# Patient Record
Sex: FEMALE | Race: WHITE | NOT HISPANIC OR LATINO | URBAN - METROPOLITAN AREA
[De-identification: names, ages, dates, MRNs, and addresses within clinical notes are randomized per-mention and may not be internally consistent; named-entity substitution may affect disease eponyms.]

---

## 2017-03-30 ENCOUNTER — OUTPATIENT (OUTPATIENT)
Dept: OUTPATIENT SERVICES | Facility: HOSPITAL | Age: 67
LOS: 1 days | Discharge: HOME | End: 2017-03-30

## 2017-06-27 DIAGNOSIS — I10 ESSENTIAL (PRIMARY) HYPERTENSION: ICD-10-CM

## 2017-06-27 DIAGNOSIS — Z86.73 PERSONAL HISTORY OF TRANSIENT ISCHEMIC ATTACK (TIA), AND CEREBRAL INFARCTION WITHOUT RESIDUAL DEFICITS: ICD-10-CM

## 2017-06-27 DIAGNOSIS — R41.0 DISORIENTATION, UNSPECIFIED: ICD-10-CM

## 2017-06-27 DIAGNOSIS — R42 DIZZINESS AND GIDDINESS: ICD-10-CM

## 2019-07-03 ENCOUNTER — APPOINTMENT (OUTPATIENT)
Dept: CARDIOLOGY | Facility: CLINIC | Age: 69
End: 2019-07-03

## 2019-09-09 ENCOUNTER — APPOINTMENT (OUTPATIENT)
Dept: CARDIOLOGY | Facility: CLINIC | Age: 69
End: 2019-09-09

## 2020-07-28 ENCOUNTER — RECORD ABSTRACTING (OUTPATIENT)
Age: 70
End: 2020-07-28

## 2020-07-28 DIAGNOSIS — Z78.9 OTHER SPECIFIED HEALTH STATUS: ICD-10-CM

## 2020-07-28 DIAGNOSIS — I10 ESSENTIAL (PRIMARY) HYPERTENSION: ICD-10-CM

## 2020-07-28 DIAGNOSIS — I49.3 VENTRICULAR PREMATURE DEPOLARIZATION: ICD-10-CM

## 2020-07-28 DIAGNOSIS — R42 DIZZINESS AND GIDDINESS: ICD-10-CM

## 2020-07-28 RX ORDER — OMEPRAZOLE 40 MG/1
40 CAPSULE, DELAYED RELEASE ORAL
Refills: 0 | Status: ACTIVE | COMMUNITY

## 2020-07-28 RX ORDER — CHOLECALCIFEROL (VITAMIN D3) 125 MCG
TABLET ORAL
Refills: 0 | Status: ACTIVE | COMMUNITY

## 2020-07-28 RX ORDER — ATORVASTATIN CALCIUM 80 MG/1
80 TABLET, FILM COATED ORAL DAILY
Refills: 0 | Status: ACTIVE | COMMUNITY

## 2020-07-28 RX ORDER — AMLODIPINE BESYLATE 5 MG/1
5 TABLET ORAL DAILY
Refills: 0 | Status: ACTIVE | COMMUNITY

## 2020-07-28 RX ORDER — PNV NO.95/FERROUS FUM/FOLIC AC 28MG-0.8MG
TABLET ORAL
Refills: 0 | Status: ACTIVE | COMMUNITY

## 2020-07-28 RX ORDER — ASPIRIN 325 MG/1
325 TABLET, FILM COATED ORAL
Refills: 0 | Status: ACTIVE | COMMUNITY

## 2020-07-28 RX ORDER — UMECLIDINIUM 62.5 UG/1
62.5 AEROSOL, POWDER ORAL
Refills: 0 | Status: ACTIVE | COMMUNITY

## 2020-07-28 RX ORDER — ALPRAZOLAM 0.25 MG/1
0.25 TABLET ORAL
Refills: 0 | Status: ACTIVE | COMMUNITY

## 2020-08-10 ENCOUNTER — APPOINTMENT (OUTPATIENT)
Dept: CARDIOLOGY | Facility: CLINIC | Age: 70
End: 2020-08-10

## 2020-09-29 ENCOUNTER — OUTPATIENT (OUTPATIENT)
Dept: OUTPATIENT SERVICES | Facility: HOSPITAL | Age: 70
LOS: 1 days | Discharge: HOME | End: 2020-09-29

## 2020-09-30 DIAGNOSIS — Z11.59 ENCOUNTER FOR SCREENING FOR OTHER VIRAL DISEASES: ICD-10-CM

## 2020-10-02 ENCOUNTER — OUTPATIENT (OUTPATIENT)
Dept: OUTPATIENT SERVICES | Facility: HOSPITAL | Age: 70
LOS: 1 days | Discharge: HOME | End: 2020-10-02

## 2020-10-02 VITALS — DIASTOLIC BLOOD PRESSURE: 75 MMHG | SYSTOLIC BLOOD PRESSURE: 170 MMHG | HEART RATE: 62 BPM | RESPIRATION RATE: 17 BRPM

## 2020-10-02 VITALS
RESPIRATION RATE: 18 BRPM | TEMPERATURE: 98 F | HEART RATE: 73 BPM | OXYGEN SATURATION: 97 % | DIASTOLIC BLOOD PRESSURE: 82 MMHG | WEIGHT: 166.89 LBS | HEIGHT: 64 IN | SYSTOLIC BLOOD PRESSURE: 179 MMHG

## 2020-10-02 NOTE — PACU DISCHARGE NOTE - COMMENTS
69 y o female S/P Right ECCE with IOL Implant, LSB/MAC without complications. VS /74 HR 61 RR 16 SaO2 99%. Pt tolerated procedure well.

## 2020-10-06 ENCOUNTER — OUTPATIENT (OUTPATIENT)
Dept: OUTPATIENT SERVICES | Facility: HOSPITAL | Age: 70
LOS: 1 days | Discharge: HOME | End: 2020-10-06

## 2020-10-06 DIAGNOSIS — Z11.59 ENCOUNTER FOR SCREENING FOR OTHER VIRAL DISEASES: ICD-10-CM

## 2020-10-07 DIAGNOSIS — Z79.82 LONG TERM (CURRENT) USE OF ASPIRIN: ICD-10-CM

## 2020-10-07 DIAGNOSIS — E55.9 VITAMIN D DEFICIENCY, UNSPECIFIED: ICD-10-CM

## 2020-10-07 DIAGNOSIS — K21.9 GASTRO-ESOPHAGEAL REFLUX DISEASE WITHOUT ESOPHAGITIS: ICD-10-CM

## 2020-10-07 DIAGNOSIS — H25.11 AGE-RELATED NUCLEAR CATARACT, RIGHT EYE: ICD-10-CM

## 2020-10-07 DIAGNOSIS — Z86.73 PERSONAL HISTORY OF TRANSIENT ISCHEMIC ATTACK (TIA), AND CEREBRAL INFARCTION WITHOUT RESIDUAL DEFICITS: ICD-10-CM

## 2020-10-07 DIAGNOSIS — I10 ESSENTIAL (PRIMARY) HYPERTENSION: ICD-10-CM

## 2020-10-08 NOTE — ASU PATIENT PROFILE, ADULT - PMH
After-cataract of both eyes    Benign hypertension without CHF    Chronic GERD    H/O anxiety disorder

## 2020-10-09 ENCOUNTER — OUTPATIENT (OUTPATIENT)
Dept: OUTPATIENT SERVICES | Facility: HOSPITAL | Age: 70
LOS: 1 days | Discharge: HOME | End: 2020-10-09

## 2020-10-09 VITALS
HEART RATE: 63 BPM | DIASTOLIC BLOOD PRESSURE: 72 MMHG | RESPIRATION RATE: 98 BRPM | TEMPERATURE: 98 F | WEIGHT: 166.01 LBS | OXYGEN SATURATION: 97 % | HEIGHT: 65 IN | SYSTOLIC BLOOD PRESSURE: 167 MMHG

## 2020-10-09 VITALS — SYSTOLIC BLOOD PRESSURE: 178 MMHG | DIASTOLIC BLOOD PRESSURE: 88 MMHG | HEART RATE: 64 BPM | RESPIRATION RATE: 17 BRPM

## 2020-10-09 DIAGNOSIS — Z98.41 CATARACT EXTRACTION STATUS, RIGHT EYE: Chronic | ICD-10-CM

## 2020-10-09 RX ORDER — AMLODIPINE BESYLATE 2.5 MG/1
1 TABLET ORAL
Qty: 0 | Refills: 0 | DISCHARGE

## 2020-10-09 RX ORDER — OMEPRAZOLE 10 MG/1
1 CAPSULE, DELAYED RELEASE ORAL
Qty: 0 | Refills: 0 | DISCHARGE

## 2020-10-14 DIAGNOSIS — E55.9 VITAMIN D DEFICIENCY, UNSPECIFIED: ICD-10-CM

## 2020-10-14 DIAGNOSIS — F41.9 ANXIETY DISORDER, UNSPECIFIED: ICD-10-CM

## 2020-10-14 DIAGNOSIS — H25.12 AGE-RELATED NUCLEAR CATARACT, LEFT EYE: ICD-10-CM

## 2020-10-14 DIAGNOSIS — I10 ESSENTIAL (PRIMARY) HYPERTENSION: ICD-10-CM

## 2020-10-14 DIAGNOSIS — Z79.82 LONG TERM (CURRENT) USE OF ASPIRIN: ICD-10-CM

## 2020-11-04 ENCOUNTER — APPOINTMENT (OUTPATIENT)
Dept: CARDIOLOGY | Facility: CLINIC | Age: 70
End: 2020-11-04
Payer: MEDICARE

## 2020-11-04 VITALS
DIASTOLIC BLOOD PRESSURE: 90 MMHG | BODY MASS INDEX: 27.83 KG/M2 | SYSTOLIC BLOOD PRESSURE: 140 MMHG | WEIGHT: 163 LBS | HEART RATE: 61 BPM | HEIGHT: 64 IN

## 2020-11-04 DIAGNOSIS — Z45.09 ENCOUNTER FOR ADJUSTMENT AND MANAGEMENT OF OTHER CARDIAC DEVICE: ICD-10-CM

## 2020-11-04 DIAGNOSIS — I67.9 CEREBROVASCULAR DISEASE, UNSPECIFIED: ICD-10-CM

## 2020-11-04 DIAGNOSIS — Z00.00 ENCOUNTER FOR GENERAL ADULT MEDICAL EXAMINATION W/OUT ABNORMAL FINDINGS: ICD-10-CM

## 2020-11-04 PROCEDURE — 99072 ADDL SUPL MATRL&STAF TM PHE: CPT

## 2020-11-04 PROCEDURE — 93000 ELECTROCARDIOGRAM COMPLETE: CPT

## 2020-11-04 PROCEDURE — 99214 OFFICE O/P EST MOD 30 MIN: CPT

## 2020-11-04 NOTE — ASSESSMENT
[FreeTextEntry1] : 68 y/o with an ILR implanted to r/o Afib , hx of TIA \par \par Interrogation of the loop recorder today , no events , no arrhythmia's . \par Battery reached EOS status . \par \par Discussed with the patient loop removal , she opted  to postpone any procedures at this time due to COVID 19 pandemic . She will call us to schedule for loop extraction if she changes her decision. \par \par Her BP was elevated today . Reports feeling anxious in the office. \par Advised to keep a BP log at home and to  follow up with her cardiologist/Dr. Kim  . She verbalized full understanding. \par Instructed to continue her current medications, f/u with PCP.  Patient discharged from EP services. \par

## 2020-11-04 NOTE — PHYSICAL EXAM
[General Appearance - Well Developed] : well developed [General Appearance - Well Nourished] : well nourished [Heart Rate And Rhythm] : heart rate and rhythm were normal [Heart Sounds] : normal S1 and S2 [] : no respiratory distress [Respiration, Rhythm And Depth] : normal respiratory rhythm and effort [Auscultation Breath Sounds / Voice Sounds] : lungs were clear to auscultation bilaterally [Clean] : clean [Dry] : dry [Well-Healed] : well-healed [Abdomen Soft] : soft [Nail Clubbing] : no clubbing of the fingernails [Cyanosis, Localized] : no localized cyanosis

## 2020-11-04 NOTE — HISTORY OF PRESENT ILLNESS
[de-identified] : 70 y/o female with pmh of TIA s/p ILR 3/2017,COPD,former smoker, HTN \par She is returning for follow up , loop recorder interrogation \par \par Denies CP/SOB or palpations. Denies dizziness, near syncope or syncope . \par \par ECG (11/4/2020): NSR at 61 bpm, normal axis, LVH  \par \par \par

## 2020-11-04 NOTE — PROCEDURE
[No] : not [de-identified] : [employer] [de-identified] : LOOP [de-identified] : [id number] [de-identified] : 3-30-17 [de-identified] : no events - Battery at [facility]

## 2021-11-14 ENCOUNTER — TRANSCRIPTION ENCOUNTER (OUTPATIENT)
Age: 71
End: 2021-11-14

## 2021-11-24 ENCOUNTER — TRANSCRIPTION ENCOUNTER (OUTPATIENT)
Age: 71
End: 2021-11-24

## 2021-12-29 ENCOUNTER — NON-APPOINTMENT (OUTPATIENT)
Age: 71
End: 2021-12-29

## 2022-01-24 PROBLEM — K21.9 GASTRO-ESOPHAGEAL REFLUX DISEASE WITHOUT ESOPHAGITIS: Chronic | Status: ACTIVE | Noted: 2020-10-09

## 2022-01-24 PROBLEM — H26.40 UNSPECIFIED SECONDARY CATARACT: Chronic | Status: ACTIVE | Noted: 2020-10-09

## 2022-03-21 ENCOUNTER — OUTPATIENT (OUTPATIENT)
Dept: OUTPATIENT SERVICES | Facility: HOSPITAL | Age: 72
LOS: 1 days | Discharge: HOME | End: 2022-03-21
Payer: MEDICARE

## 2022-03-21 DIAGNOSIS — R00.2 PALPITATIONS: ICD-10-CM

## 2022-03-21 DIAGNOSIS — Z98.41 CATARACT EXTRACTION STATUS, RIGHT EYE: Chronic | ICD-10-CM

## 2022-03-21 PROCEDURE — 33286 RMVL SUBQ CAR RHYTHM MNTR: CPT

## 2022-03-21 NOTE — H&P ADULT - NSHPPHYSICALEXAM_GEN_ALL_CORE
General: Elderly female, NAD, well appearing  Pulm: CTA bilaterally. No rales, wheezing or rhonchi  Cardiac: S1S2 RRR. Nu rubs, murmurs, or gallops  GI: Soft, non-tender, non-distended. + BS  PV: Warm and well perfused. No clubbing cyanosis or edema  Neuro: AO x4, PEDROZA, speech clear  Psych: Normal mood and affect

## 2022-03-21 NOTE — PROGRESS NOTE ADULT - SUBJECTIVE AND OBJECTIVE BOX
Electrophysiology Brief Post-Op Note    I have personally seen and examined the patient.  I agree with the history and physical which I have reviewed and noted any changes below.  03-21-22 @ 10:27    PRE-OP DIAGNOSIS: TIA    POST-OP DIAGNOSIS: TIA    PROCEDURE: Loop Explant    Physician: Dr. Merchant  Assistant: AMANDA Batista    ESTIMATED BLOOD LOSS:  1     mL    ANESTHESIA TYPE:  [  ]General Anesthesia  [  ] Sedation  [X  ] Local/Regional    CONDITION  [  ] Critical  [  ] Serious  [  ]Fair  [ X ]Good    SPECIMENS REMOVED (IF APPLICABLE):  Loop    IMPLANTS (IF APPLICABLE)  None    FINDINGS  PLAN OF CARE  - FU 4-6 weeks  - May shower in 2 days  - Remove band aid in 2 days

## 2022-03-21 NOTE — H&P ADULT - NSICDXPASTMEDICALHX_GEN_ALL_CORE_FT
PAST MEDICAL HISTORY:  After-cataract of both eyes     Benign hypertension without CHF     Chronic GERD     H/O anxiety disorder     Transient ischemic attack (TIA) 3/2017    
shortness of breath

## 2022-03-21 NOTE — H&P ADULT - HISTORY OF PRESENT ILLNESS
This is a 70 y/o female with PMH GERD, anxiety (xanax), HTN, COPD, former smoker, TIA s/p ILR who presents for loop explantation. Pt denies any recent illness, fever, chills, palpitations, SOB, chest pain, abd pain, n/v/d/c, dysuria.

## 2022-03-21 NOTE — H&P ADULT - ASSESSMENT
EP: Dr. Merchant  Cardiology: Dr. Kim    This is a 72 y/o female with PMH GERD, anxiety (xanax), HTN, COPD, former smoker, TIA s/p ILR who presents for loop explantation. EP: Dr. Merchant  Cardiology: Dr. Kim    This is a 70 y/o female with PMH GERD, anxiety (xanax), HTN, COPD, former smoker, TIA s/p ILR who presents for loop explantation.    Plan:  - Loop explant today  - Resume diet  - Resume home meds  - No need for further follow up with EP  - Routine fu with cardiologist

## 2022-03-21 NOTE — CHART NOTE - NSCHARTNOTEFT_GEN_A_CORE
Cardiac Electrophysiology Procedure Note    Procedure: [employer]  Implantable Loop Recorder Explant    Indication: TIA    Attending: Dr. Merchant	  Assisting: NP Gatdula    EQUIPMENT EXPLANTED   :   [employer]onic Linq  Serial Number  : [id number]    DESCRIPTION OF PROCEDURE  The patient was brought to the Procedure Room in a nonsedated and fasting state. Informed, written consent was obtained prior to the procedure.  The left shoulder region was cleaned and prepped with serial applications of Chlorhexidine. Patient was then covered with sterile drapes in the usual manner. Blood pressure, oxygenation and level of comfort were stable throughout.     	The left parasternal region was defined; 10 cc of lidocaine solution were infiltrated into the skin overlying the left deltopectoral groove. A 5 mm. incision was then made. The loop recorder was removed from under the skin.  The wound margins approximated well, without any tension or overlap. The wound was closed using dermabond. A dry, sterile dressing was placed over this.     COMPLICATIONS:  The patient tolerated the procedure well. There were no immediate complications.    CONCLUSIONS:  Successful explant of loop recorder.

## 2022-03-25 DIAGNOSIS — F41.9 ANXIETY DISORDER, UNSPECIFIED: ICD-10-CM

## 2022-03-25 DIAGNOSIS — K21.9 GASTRO-ESOPHAGEAL REFLUX DISEASE WITHOUT ESOPHAGITIS: ICD-10-CM

## 2022-03-25 DIAGNOSIS — I10 ESSENTIAL (PRIMARY) HYPERTENSION: ICD-10-CM

## 2022-03-25 DIAGNOSIS — Z45.09 ENCOUNTER FOR ADJUSTMENT AND MANAGEMENT OF OTHER CARDIAC DEVICE: ICD-10-CM

## 2022-03-25 DIAGNOSIS — Z79.82 LONG TERM (CURRENT) USE OF ASPIRIN: ICD-10-CM

## 2022-03-25 DIAGNOSIS — J44.9 CHRONIC OBSTRUCTIVE PULMONARY DISEASE, UNSPECIFIED: ICD-10-CM

## 2022-03-25 DIAGNOSIS — Z87.891 PERSONAL HISTORY OF NICOTINE DEPENDENCE: ICD-10-CM

## 2022-03-25 DIAGNOSIS — Z86.73 PERSONAL HISTORY OF TRANSIENT ISCHEMIC ATTACK (TIA), AND CEREBRAL INFARCTION WITHOUT RESIDUAL DEFICITS: ICD-10-CM

## 2022-08-04 NOTE — ASU PATIENT PROFILE, ADULT - CENTRAL VENOUS CATHETER
no
(1) Mild-to-moderate dysarthria; patient slurs at least some words and, at worst, can be understood with some difficulty

## 2022-12-12 ENCOUNTER — EMERGENCY (EMERGENCY)
Facility: HOSPITAL | Age: 72
LOS: 0 days | Discharge: HOME | End: 2022-12-12
Attending: EMERGENCY MEDICINE | Admitting: EMERGENCY MEDICINE

## 2022-12-12 VITALS
DIASTOLIC BLOOD PRESSURE: 60 MMHG | OXYGEN SATURATION: 96 % | HEART RATE: 90 BPM | TEMPERATURE: 98 F | SYSTOLIC BLOOD PRESSURE: 136 MMHG | WEIGHT: 160.06 LBS | HEIGHT: 64 IN | RESPIRATION RATE: 22 BRPM

## 2022-12-12 VITALS — DIASTOLIC BLOOD PRESSURE: 74 MMHG | HEART RATE: 82 BPM | SYSTOLIC BLOOD PRESSURE: 130 MMHG

## 2022-12-12 DIAGNOSIS — Z79.82 LONG TERM (CURRENT) USE OF ASPIRIN: ICD-10-CM

## 2022-12-12 DIAGNOSIS — F41.0 PANIC DISORDER [EPISODIC PAROXYSMAL ANXIETY]: ICD-10-CM

## 2022-12-12 DIAGNOSIS — Z87.891 PERSONAL HISTORY OF NICOTINE DEPENDENCE: ICD-10-CM

## 2022-12-12 DIAGNOSIS — E87.6 HYPOKALEMIA: ICD-10-CM

## 2022-12-12 DIAGNOSIS — R53.0 NEOPLASTIC (MALIGNANT) RELATED FATIGUE: ICD-10-CM

## 2022-12-12 DIAGNOSIS — Z98.41 CATARACT EXTRACTION STATUS, RIGHT EYE: ICD-10-CM

## 2022-12-12 DIAGNOSIS — Z86.73 PERSONAL HISTORY OF TRANSIENT ISCHEMIC ATTACK (TIA), AND CEREBRAL INFARCTION WITHOUT RESIDUAL DEFICITS: ICD-10-CM

## 2022-12-12 DIAGNOSIS — K21.9 GASTRO-ESOPHAGEAL REFLUX DISEASE WITHOUT ESOPHAGITIS: ICD-10-CM

## 2022-12-12 DIAGNOSIS — Z98.41 CATARACT EXTRACTION STATUS, RIGHT EYE: Chronic | ICD-10-CM

## 2022-12-12 DIAGNOSIS — I10 ESSENTIAL (PRIMARY) HYPERTENSION: ICD-10-CM

## 2022-12-12 PROBLEM — G45.9 TRANSIENT CEREBRAL ISCHEMIC ATTACK, UNSPECIFIED: Chronic | Status: ACTIVE | Noted: 2022-03-21

## 2022-12-12 LAB
ALBUMIN SERPL ELPH-MCNC: 3.6 G/DL — SIGNIFICANT CHANGE UP (ref 3.5–5.2)
ALP SERPL-CCNC: 103 U/L — SIGNIFICANT CHANGE UP (ref 30–115)
ALT FLD-CCNC: 33 U/L — SIGNIFICANT CHANGE UP (ref 0–41)
ANION GAP SERPL CALC-SCNC: 16 MMOL/L — HIGH (ref 7–14)
AST SERPL-CCNC: 43 U/L — HIGH (ref 0–41)
BASOPHILS # BLD AUTO: 0.03 K/UL — SIGNIFICANT CHANGE UP (ref 0–0.2)
BASOPHILS NFR BLD AUTO: 0.3 % — SIGNIFICANT CHANGE UP (ref 0–1)
BILIRUB SERPL-MCNC: 0.4 MG/DL — SIGNIFICANT CHANGE UP (ref 0.2–1.2)
BUN SERPL-MCNC: 17 MG/DL — SIGNIFICANT CHANGE UP (ref 10–20)
CALCIUM SERPL-MCNC: 6 MG/DL — LOW (ref 8.4–10.5)
CHLORIDE SERPL-SCNC: 98 MMOL/L — SIGNIFICANT CHANGE UP (ref 98–110)
CO2 SERPL-SCNC: 26 MMOL/L — SIGNIFICANT CHANGE UP (ref 17–32)
CREAT SERPL-MCNC: 0.7 MG/DL — SIGNIFICANT CHANGE UP (ref 0.7–1.5)
EGFR: 91 ML/MIN/1.73M2 — SIGNIFICANT CHANGE UP
EOSINOPHIL # BLD AUTO: 0.07 K/UL — SIGNIFICANT CHANGE UP (ref 0–0.7)
EOSINOPHIL NFR BLD AUTO: 0.8 % — SIGNIFICANT CHANGE UP (ref 0–8)
GLUCOSE SERPL-MCNC: 78 MG/DL — SIGNIFICANT CHANGE UP (ref 70–99)
HCT VFR BLD CALC: 37 % — SIGNIFICANT CHANGE UP (ref 37–47)
HGB BLD-MCNC: 12.1 G/DL — SIGNIFICANT CHANGE UP (ref 12–16)
IMM GRANULOCYTES NFR BLD AUTO: 0.2 % — SIGNIFICANT CHANGE UP (ref 0.1–0.3)
LYMPHOCYTES # BLD AUTO: 1.24 K/UL — SIGNIFICANT CHANGE UP (ref 1.2–3.4)
LYMPHOCYTES # BLD AUTO: 13.9 % — LOW (ref 20.5–51.1)
MCHC RBC-ENTMCNC: 27.3 PG — SIGNIFICANT CHANGE UP (ref 27–31)
MCHC RBC-ENTMCNC: 32.7 G/DL — SIGNIFICANT CHANGE UP (ref 32–37)
MCV RBC AUTO: 83.5 FL — SIGNIFICANT CHANGE UP (ref 81–99)
MONOCYTES # BLD AUTO: 0.92 K/UL — HIGH (ref 0.1–0.6)
MONOCYTES NFR BLD AUTO: 10.3 % — HIGH (ref 1.7–9.3)
NEUTROPHILS # BLD AUTO: 6.67 K/UL — HIGH (ref 1.4–6.5)
NEUTROPHILS NFR BLD AUTO: 74.5 % — SIGNIFICANT CHANGE UP (ref 42.2–75.2)
NRBC # BLD: 0 /100 WBCS — SIGNIFICANT CHANGE UP (ref 0–0)
PLATELET # BLD AUTO: 295 K/UL — SIGNIFICANT CHANGE UP (ref 130–400)
POTASSIUM SERPL-MCNC: 2.6 MMOL/L — CRITICAL LOW (ref 3.5–5)
POTASSIUM SERPL-SCNC: 2.6 MMOL/L — CRITICAL LOW (ref 3.5–5)
PROT SERPL-MCNC: 7.1 G/DL — SIGNIFICANT CHANGE UP (ref 6–8)
RBC # BLD: 4.43 M/UL — SIGNIFICANT CHANGE UP (ref 4.2–5.4)
RBC # FLD: 14.2 % — SIGNIFICANT CHANGE UP (ref 11.5–14.5)
SODIUM SERPL-SCNC: 140 MMOL/L — SIGNIFICANT CHANGE UP (ref 135–146)
TROPONIN T SERPL-MCNC: <0.01 NG/ML — SIGNIFICANT CHANGE UP
WBC # BLD: 8.95 K/UL — SIGNIFICANT CHANGE UP (ref 4.8–10.8)
WBC # FLD AUTO: 8.95 K/UL — SIGNIFICANT CHANGE UP (ref 4.8–10.8)

## 2022-12-12 PROCEDURE — 99284 EMERGENCY DEPT VISIT MOD MDM: CPT

## 2022-12-12 PROCEDURE — 71046 X-RAY EXAM CHEST 2 VIEWS: CPT | Mod: 26

## 2022-12-12 RX ORDER — ASPIRIN/CALCIUM CARB/MAGNESIUM 324 MG
1 TABLET ORAL
Qty: 0 | Refills: 0 | DISCHARGE

## 2022-12-12 RX ORDER — CYCLOBENZAPRINE HYDROCHLORIDE 10 MG/1
1 TABLET, FILM COATED ORAL
Qty: 21 | Refills: 0
Start: 2022-12-12 | End: 2022-12-18

## 2022-12-12 RX ORDER — ACETAMINOPHEN 500 MG
975 TABLET ORAL EVERY 6 HOURS
Refills: 0 | Status: DISCONTINUED | OUTPATIENT
Start: 2022-12-12 | End: 2022-12-12

## 2022-12-12 RX ORDER — IBUPROFEN 200 MG
1 TABLET ORAL
Qty: 28 | Refills: 0
Start: 2022-12-12 | End: 2022-12-18

## 2022-12-12 RX ORDER — AMLODIPINE BESYLATE 2.5 MG/1
1 TABLET ORAL
Qty: 0 | Refills: 0 | DISCHARGE

## 2022-12-12 RX ORDER — ALPRAZOLAM 0.25 MG
1 TABLET ORAL
Qty: 0 | Refills: 0 | DISCHARGE

## 2022-12-12 RX ORDER — POTASSIUM CHLORIDE 20 MEQ
1 PACKET (EA) ORAL
Qty: 14 | Refills: 0
Start: 2022-12-12 | End: 2022-12-18

## 2022-12-12 RX ORDER — POTASSIUM CHLORIDE 20 MEQ
40 PACKET (EA) ORAL ONCE
Refills: 0 | Status: COMPLETED | OUTPATIENT
Start: 2022-12-12 | End: 2022-12-12

## 2022-12-12 RX ORDER — ATORVASTATIN CALCIUM 80 MG/1
1 TABLET, FILM COATED ORAL
Qty: 0 | Refills: 0 | DISCHARGE

## 2022-12-12 RX ADMIN — Medication 40 MILLIEQUIVALENT(S): at 16:52

## 2022-12-12 RX ADMIN — Medication 975 MILLIGRAM(S): at 15:34

## 2022-12-12 NOTE — ED ADULT NURSE NOTE - NSICDXPASTMEDICALHX_GEN_ALL_CORE_FT
PAST MEDICAL HISTORY:  After-cataract of both eyes     Benign hypertension without CHF     Chronic GERD     H/O anxiety disorder     Transient ischemic attack (TIA) 3/2017

## 2022-12-12 NOTE — ED PROVIDER NOTE - OBJECTIVE STATEMENT
73 y/o female p/w complaint of panic attack today. Patient states this is 3rd time in past 2 days that she has had a panic attack. She states her daughter recently came home from being admitted to hospital for 6 weeks with severe COVID, patient attributes these episodes to stress due to her daughter's health. Pt states during the attack she feels paresthesias in her face, feels chest pressure, all of which subsides within a few minutes. Symptoms are non-exertional. Patient also endorses neck stiffness x 1 week which she attributes to sleeping on the couch near her daughter's stretcher at home. Pt denies CP, SOB, n/v, diaphoresis, LE swelling.

## 2022-12-12 NOTE — ED PROVIDER NOTE - ATTENDING APP SHARED VISIT CONTRIBUTION OF CARE
Patient complains of anxiety and generalized weakness.  She attributes this to her daughter's illness.  She states that she has been eating poorly recently. She denies chest pain, shortness of breath, palpitations, syncope. Vital signs noted.  No apparent distress.  Negative Chvostek's sign.  Chest is clear.  Heart regular rate no murmur.  Abdomen nontender.  Extremity equal pulses.  Neuro grossly intact.  Diagnostic testing reviewed.  No electrolyte disturbances.  Etiology of electrolyte disturbances may be nutritional.  Patient is not on a diuretic currently.  Disposition decision making discussed with patient.  She strongly prefers oral potassium supplementation at this time.  I will defer calcium supplementation at this time until further outpatient work-up can be initiated.  Copies of all diagnostic testing provided to patient aid in follow-up.  Strict return precautions discussed with patient.

## 2022-12-12 NOTE — ED PROVIDER NOTE - PATIENT PORTAL LINK FT
You can access the FollowMyHealth Patient Portal offered by [facility] by registering at the following website: [email or web address]. By joining [employer] ([employer]’s FollowMyHealth portal, you will also be able to view your health information using other applications (apps) compatible with our system.

## 2022-12-12 NOTE — ED PROVIDER NOTE - NS ED ROS FT
Constitutional: (-) fever, (-) chills  Eyes: (-) visual changes  ENT: (-) nasal congestion  Cardiovascular: (-) chest pain, (-) syncope  Respiratory: (-) cough, (-) shortness of breath, (-) dyspnea,   Gastrointestinal: (-) vomiting, (-)nausea,  Musculoskeletal: (-) neck pain, (-) back pain, (-) joint pain,  Integumentary: (-) rash, (-) edema, (-) bruises  Neurological: (-) headache, (-) loc, (-) dizziness, (-) tingling, (-)numbness,  Psychiatric: (-) hallucinations, (+)anxiety, (-)depression, (-)SI/HI  Peripheral Vascular: (-) leg swelling  :  (-)dysuria,  (-) hematuria  Allergic/Immunologic: (-) pruritus

## 2022-12-12 NOTE — ED PROVIDER NOTE - PHYSICAL EXAMINATION
Physical Exam    Vital Signs: I have reviewed the initial vital signs.  Constitutional: appears stated age, no acute distress  Eyes: Conjunctiva pink, Sclera clear, PERRLA, EOMI.  ENT: OP is clear without exudates, normal dentition, normal gingival, tongue without swelling, TMs clear b/l, nasal turbinates without erythema or discharge, no lymphadenopathy.  Cardiovascular: S1 and S2, regular rate, regular rhythm, well-perfused extremities, radial pulses equal and 2+, pedal pulses 2+ and equal  Respiratory: unlabored respiratory effort, clear to auscultation bilaterally no wheezing, rales and rhonchi  Gastrointestinal: soft, non-tender abdomen, no pulsatile mass, normal bowl sounds  Musculoskeletal: supple neck, no lower extremity edema, no midline tenderness  Integumentary: warm, dry, no rash  Neurologic: awake, alert, oriented x 3, cranial nerves II-XII grossly intact, extremities’ motor and sensory functions grossly intact, finger to nose intact, normal gait, no pronator drift.

## 2022-12-12 NOTE — ED PROVIDER NOTE - NSFOLLOWUPINSTRUCTIONS_ED_ALL_ED_FT
Please follow-up with your PCP in the next 1-3 days to address potential causes of hypokalemia.     Anxiety    Generalized anxiety disorder (SANDRINE) is a mental disorder. It is defined as anxiety that is not necessarily related to specific events or activities or is out of proportion to said events. Symptoms include restlessness, fatigue, difficulty concentrations, irritability and difficulty concentrating. It may interfere with life functions, including relationships, work, and school. If you were started on a medication, make sure to take exactly as prescribed and follow up with a psychiatrist.    SEEK IMMEDIATE MEDICAL CARE IF YOU HAVE ANY OF THE FOLLOWING SYMPTOMS: thoughts about hurting killing yourself, thoughts about hurting or killing somebody else, hallucinations, or worsening depression.

## 2022-12-28 ENCOUNTER — INPATIENT (INPATIENT)
Facility: HOSPITAL | Age: 72
LOS: 2 days | Discharge: HOME HEALTH | End: 2022-12-31
Attending: INTERNAL MEDICINE | Admitting: INTERNAL MEDICINE
Payer: MEDICARE

## 2022-12-28 VITALS
TEMPERATURE: 96 F | SYSTOLIC BLOOD PRESSURE: 146 MMHG | RESPIRATION RATE: 18 BRPM | WEIGHT: 160.06 LBS | DIASTOLIC BLOOD PRESSURE: 76 MMHG | HEART RATE: 94 BPM | OXYGEN SATURATION: 94 % | HEIGHT: 64 IN

## 2022-12-28 DIAGNOSIS — Z98.41 CATARACT EXTRACTION STATUS, RIGHT EYE: Chronic | ICD-10-CM

## 2022-12-28 LAB
ALBUMIN SERPL ELPH-MCNC: 3.6 G/DL — SIGNIFICANT CHANGE UP (ref 3.5–5.2)
ALP SERPL-CCNC: 98 U/L — SIGNIFICANT CHANGE UP (ref 30–115)
ALT FLD-CCNC: 23 U/L — SIGNIFICANT CHANGE UP (ref 0–41)
ANION GAP SERPL CALC-SCNC: 17 MMOL/L — HIGH (ref 7–14)
ANION GAP SERPL CALC-SCNC: 17 MMOL/L — HIGH (ref 7–14)
APPEARANCE UR: CLEAR — SIGNIFICANT CHANGE UP
AST SERPL-CCNC: 32 U/L — SIGNIFICANT CHANGE UP (ref 0–41)
BACTERIA # UR AUTO: ABNORMAL
BASOPHILS # BLD AUTO: 0.02 K/UL — SIGNIFICANT CHANGE UP (ref 0–0.2)
BASOPHILS NFR BLD AUTO: 0.2 % — SIGNIFICANT CHANGE UP (ref 0–1)
BILIRUB SERPL-MCNC: 0.6 MG/DL — SIGNIFICANT CHANGE UP (ref 0.2–1.2)
BILIRUB UR-MCNC: ABNORMAL
BUN SERPL-MCNC: 17 MG/DL — SIGNIFICANT CHANGE UP (ref 10–20)
BUN SERPL-MCNC: 19 MG/DL — SIGNIFICANT CHANGE UP (ref 10–20)
CALCIUM SERPL-MCNC: 5.3 MG/DL — CRITICAL LOW (ref 8.4–10.5)
CALCIUM SERPL-MCNC: 6.4 MG/DL — LOW (ref 8.4–10.5)
CHLORIDE SERPL-SCNC: 105 MMOL/L — SIGNIFICANT CHANGE UP (ref 98–110)
CHLORIDE SERPL-SCNC: 99 MMOL/L — SIGNIFICANT CHANGE UP (ref 98–110)
CO2 SERPL-SCNC: 19 MMOL/L — SIGNIFICANT CHANGE UP (ref 17–32)
CO2 SERPL-SCNC: 24 MMOL/L — SIGNIFICANT CHANGE UP (ref 17–32)
COLOR SPEC: YELLOW — SIGNIFICANT CHANGE UP
CREAT SERPL-MCNC: 0.5 MG/DL — LOW (ref 0.7–1.5)
CREAT SERPL-MCNC: 0.6 MG/DL — LOW (ref 0.7–1.5)
DIFF PNL FLD: NEGATIVE — SIGNIFICANT CHANGE UP
EGFR: 94 ML/MIN/1.73M2 — SIGNIFICANT CHANGE UP
EGFR: 98 ML/MIN/1.73M2 — SIGNIFICANT CHANGE UP
EOSINOPHIL # BLD AUTO: 0.01 K/UL — SIGNIFICANT CHANGE UP (ref 0–0.7)
EOSINOPHIL NFR BLD AUTO: 0.1 % — SIGNIFICANT CHANGE UP (ref 0–8)
EPI CELLS # UR: ABNORMAL /HPF
GLUCOSE SERPL-MCNC: 103 MG/DL — HIGH (ref 70–99)
GLUCOSE SERPL-MCNC: 63 MG/DL — LOW (ref 70–99)
GLUCOSE UR QL: NEGATIVE MG/DL — SIGNIFICANT CHANGE UP
HCT VFR BLD CALC: 36.2 % — LOW (ref 37–47)
HGB BLD-MCNC: 12.2 G/DL — SIGNIFICANT CHANGE UP (ref 12–16)
IMM GRANULOCYTES NFR BLD AUTO: 0.3 % — SIGNIFICANT CHANGE UP (ref 0.1–0.3)
KETONES UR-MCNC: 40
LEUKOCYTE ESTERASE UR-ACNC: NEGATIVE — SIGNIFICANT CHANGE UP
LYMPHOCYTES # BLD AUTO: 0.92 K/UL — LOW (ref 1.2–3.4)
LYMPHOCYTES # BLD AUTO: 7.6 % — LOW (ref 20.5–51.1)
MAGNESIUM SERPL-MCNC: 0.4 MG/DL — CRITICAL LOW (ref 1.8–2.4)
MCHC RBC-ENTMCNC: 27.9 PG — SIGNIFICANT CHANGE UP (ref 27–31)
MCHC RBC-ENTMCNC: 33.7 G/DL — SIGNIFICANT CHANGE UP (ref 32–37)
MCV RBC AUTO: 82.6 FL — SIGNIFICANT CHANGE UP (ref 81–99)
MONOCYTES # BLD AUTO: 0.81 K/UL — HIGH (ref 0.1–0.6)
MONOCYTES NFR BLD AUTO: 6.7 % — SIGNIFICANT CHANGE UP (ref 1.7–9.3)
NEUTROPHILS # BLD AUTO: 10.25 K/UL — HIGH (ref 1.4–6.5)
NEUTROPHILS NFR BLD AUTO: 85.1 % — HIGH (ref 42.2–75.2)
NITRITE UR-MCNC: NEGATIVE — SIGNIFICANT CHANGE UP
NRBC # BLD: 0 /100 WBCS — SIGNIFICANT CHANGE UP (ref 0–0)
PH UR: 5.5 — SIGNIFICANT CHANGE UP (ref 5–8)
PLATELET # BLD AUTO: 247 K/UL — SIGNIFICANT CHANGE UP (ref 130–400)
POTASSIUM SERPL-MCNC: 2.6 MMOL/L — CRITICAL LOW (ref 3.5–5)
POTASSIUM SERPL-MCNC: 3.8 MMOL/L — SIGNIFICANT CHANGE UP (ref 3.5–5)
POTASSIUM SERPL-SCNC: 2.6 MMOL/L — CRITICAL LOW (ref 3.5–5)
POTASSIUM SERPL-SCNC: 3.8 MMOL/L — SIGNIFICANT CHANGE UP (ref 3.5–5)
PROT SERPL-MCNC: 6.7 G/DL — SIGNIFICANT CHANGE UP (ref 6–8)
PROT UR-MCNC: 30 MG/DL
RBC # BLD: 4.38 M/UL — SIGNIFICANT CHANGE UP (ref 4.2–5.4)
RBC # FLD: 14.6 % — HIGH (ref 11.5–14.5)
RBC CASTS # UR COMP ASSIST: NEGATIVE — SIGNIFICANT CHANGE UP
SARS-COV-2 RNA SPEC QL NAA+PROBE: SIGNIFICANT CHANGE UP
SODIUM SERPL-SCNC: 140 MMOL/L — SIGNIFICANT CHANGE UP (ref 135–146)
SODIUM SERPL-SCNC: 141 MMOL/L — SIGNIFICANT CHANGE UP (ref 135–146)
SP GR SPEC: 1.02 — SIGNIFICANT CHANGE UP (ref 1.01–1.03)
UROBILINOGEN FLD QL: 2 MG/DL
WBC # BLD: 12.05 K/UL — HIGH (ref 4.8–10.8)
WBC # FLD AUTO: 12.05 K/UL — HIGH (ref 4.8–10.8)
WBC UR QL: SIGNIFICANT CHANGE UP /HPF

## 2022-12-28 PROCEDURE — 99285 EMERGENCY DEPT VISIT HI MDM: CPT

## 2022-12-28 PROCEDURE — 99221 1ST HOSP IP/OBS SF/LOW 40: CPT

## 2022-12-28 PROCEDURE — 93010 ELECTROCARDIOGRAM REPORT: CPT

## 2022-12-28 RX ORDER — LANOLIN ALCOHOL/MO/W.PET/CERES
3 CREAM (GRAM) TOPICAL AT BEDTIME
Refills: 0 | Status: DISCONTINUED | OUTPATIENT
Start: 2022-12-28 | End: 2022-12-31

## 2022-12-28 RX ORDER — ATORVASTATIN CALCIUM 80 MG/1
80 TABLET, FILM COATED ORAL AT BEDTIME
Refills: 0 | Status: DISCONTINUED | OUTPATIENT
Start: 2022-12-28 | End: 2022-12-31

## 2022-12-28 RX ORDER — AMLODIPINE BESYLATE 2.5 MG/1
10 TABLET ORAL DAILY
Refills: 0 | Status: DISCONTINUED | OUTPATIENT
Start: 2022-12-28 | End: 2022-12-29

## 2022-12-28 RX ORDER — ASPIRIN/CALCIUM CARB/MAGNESIUM 324 MG
325 TABLET ORAL DAILY
Refills: 0 | Status: DISCONTINUED | OUTPATIENT
Start: 2022-12-28 | End: 2022-12-31

## 2022-12-28 RX ORDER — SODIUM CHLORIDE 9 MG/ML
1000 INJECTION, SOLUTION INTRAVENOUS
Refills: 0 | Status: DISCONTINUED | OUTPATIENT
Start: 2022-12-28 | End: 2022-12-29

## 2022-12-28 RX ORDER — CALCIUM GLUCONATE 100 MG/ML
2 VIAL (ML) INTRAVENOUS ONCE
Refills: 0 | Status: COMPLETED | OUTPATIENT
Start: 2022-12-28 | End: 2022-12-28

## 2022-12-28 RX ORDER — ONDANSETRON 8 MG/1
4 TABLET, FILM COATED ORAL EVERY 8 HOURS
Refills: 0 | Status: DISCONTINUED | OUTPATIENT
Start: 2022-12-28 | End: 2022-12-31

## 2022-12-28 RX ORDER — CALCIUM CARBONATE 500(1250)
1 TABLET ORAL ONCE
Refills: 0 | Status: COMPLETED | OUTPATIENT
Start: 2022-12-28 | End: 2022-12-28

## 2022-12-28 RX ORDER — POTASSIUM CHLORIDE 20 MEQ
20 PACKET (EA) ORAL ONCE
Refills: 0 | Status: COMPLETED | OUTPATIENT
Start: 2022-12-28 | End: 2022-12-28

## 2022-12-28 RX ORDER — ALPRAZOLAM 0.25 MG
0.25 TABLET ORAL THREE TIMES A DAY
Refills: 0 | Status: DISCONTINUED | OUTPATIENT
Start: 2022-12-28 | End: 2022-12-31

## 2022-12-28 RX ORDER — ONDANSETRON 8 MG/1
4 TABLET, FILM COATED ORAL ONCE
Refills: 0 | Status: COMPLETED | OUTPATIENT
Start: 2022-12-28 | End: 2022-12-28

## 2022-12-28 RX ORDER — ENOXAPARIN SODIUM 100 MG/ML
40 INJECTION SUBCUTANEOUS EVERY 24 HOURS
Refills: 0 | Status: DISCONTINUED | OUTPATIENT
Start: 2022-12-28 | End: 2022-12-31

## 2022-12-28 RX ORDER — POTASSIUM CHLORIDE 20 MEQ
40 PACKET (EA) ORAL ONCE
Refills: 0 | Status: COMPLETED | OUTPATIENT
Start: 2022-12-28 | End: 2022-12-28

## 2022-12-28 RX ORDER — MAGNESIUM SULFATE 500 MG/ML
4 VIAL (ML) INJECTION ONCE
Refills: 0 | Status: COMPLETED | OUTPATIENT
Start: 2022-12-28 | End: 2022-12-28

## 2022-12-28 RX ORDER — ACETAMINOPHEN 500 MG
650 TABLET ORAL EVERY 6 HOURS
Refills: 0 | Status: DISCONTINUED | OUTPATIENT
Start: 2022-12-28 | End: 2022-12-31

## 2022-12-28 RX ADMIN — ATORVASTATIN CALCIUM 80 MILLIGRAM(S): 80 TABLET, FILM COATED ORAL at 21:09

## 2022-12-28 RX ADMIN — Medication 50 MILLIEQUIVALENT(S): at 13:48

## 2022-12-28 RX ADMIN — Medication 650 MILLIGRAM(S): at 22:14

## 2022-12-28 RX ADMIN — SODIUM CHLORIDE 75 MILLILITER(S): 9 INJECTION, SOLUTION INTRAVENOUS at 20:24

## 2022-12-28 RX ADMIN — ONDANSETRON 4 MILLIGRAM(S): 8 TABLET, FILM COATED ORAL at 14:35

## 2022-12-28 RX ADMIN — ENOXAPARIN SODIUM 40 MILLIGRAM(S): 100 INJECTION SUBCUTANEOUS at 22:16

## 2022-12-28 RX ADMIN — Medication 200 GRAM(S): at 18:02

## 2022-12-28 RX ADMIN — Medication 0.25 MILLIGRAM(S): at 20:25

## 2022-12-28 RX ADMIN — Medication 50 MILLIEQUIVALENT(S): at 15:56

## 2022-12-28 RX ADMIN — Medication 1 TABLET(S): at 13:45

## 2022-12-28 RX ADMIN — Medication 0.25 MILLIGRAM(S): at 15:30

## 2022-12-28 RX ADMIN — Medication 100 GRAM(S): at 13:44

## 2022-12-28 RX ADMIN — Medication 40 MILLIEQUIVALENT(S): at 13:47

## 2022-12-28 NOTE — H&P ADULT - NSHPPHYSICALEXAM_GEN_ALL_CORE
GENERAL:  73y/o Female NAD, resting comfortably.  HEAD:  Atraumatic, Normocephalic  EYES: EOMI, PERRLA, conjunctiva and sclera clear  NECK: Supple, No JVD, no cervical lymphadenopathy, non-tender  CHEST/LUNG: Clear to auscultation bilaterally; No wheeze, rhonchi, or rales  HEART: Regular rate and rhythm; S1&S2  ABDOMEN: Soft, Nontender, Nondistended x 4 quadrants; Bowel sounds present  EXTREMITIES:   Peripheral Pulses Present, No clubbing, no cyanosis, or no edema, no calf tenderness  PSYCH: AAOx3, cooperative, appropriate  NEUROLOGY: WNL  SKIN: WNL ICU Vital Signs Last 24 Hrs  T(C): 35.6 (28 Dec 2022 11:14), Max: 35.6 (28 Dec 2022 11:14)  T(F): 96 (28 Dec 2022 11:14), Max: 96 (28 Dec 2022 11:14)  HR: 83 (28 Dec 2022 13:41) (83 - 94)  BP: 115/74 (28 Dec 2022 13:41) (115/74 - 146/76)  BP(mean): --  ABP: --  ABP(mean): --  RR: 18 (28 Dec 2022 13:41) (18 - 18)  SpO2: 94% (28 Dec 2022 13:41) (94% - 94%)    O2 Parameters below as of 28 Dec 2022 13:41  Patient On (Oxygen Delivery Method): room air              GENERAL:  71y/o Female NAD, resting comfortably.  HEAD:  Atraumatic, Normocephalic  EYES: EOMI, PERRLA, conjunctiva and sclera clear  NECK: Supple, No JVD, no cervical lymphadenopathy, non-tender  CHEST/LUNG: Clear to auscultation bilaterally; No wheeze, rhonchi, or rales  HEART: Regular rate and rhythm; S1&S2  ABDOMEN: Soft, Nontender, Nondistended x 4 quadrants; Bowel sounds present  EXTREMITIES:   Peripheral Pulses Present, No clubbing, no cyanosis, or no edema, no calf tenderness  PSYCH: AAOx3, cooperative, appropriate  NEUROLOGY: WNL  SKIN: WNL

## 2022-12-28 NOTE — H&P ADULT - ASSESSMENT
72-year-old female, past medical history CVA (2017) anxiety, depression, hypertension, hyperlipidemia, presents emergency department for weakness and malaise for over two weeks.    Admitted for Multiple electrolyte imbalance.    # Hypokalemia  - admitted to telemetry  - IV Potassium replacement  - serial K levels  - repeat EKG in am    # Hypomagnesemia  - IV Mg replacement  - repeat Mg 1900 and am  - follow telemetry monitoring and repeat EKG    # Hypocalcemia  - IV Calcium replacement   - monitor repeat levels    # Leukocytosis  - Check Stat UA  - Repeat CBC w/ diff am  - Resp Viral Panel  - Procalcitonin, CRP  - lactate level    # Elevated Anion Gap  - Iv Hydration  - repeat BMP am

## 2022-12-28 NOTE — H&P ADULT - HISTORY OF PRESENT ILLNESS
72-year-old female, past medical history CVA (2017) anxiety, depression, hypertension, hyperlipidemia, presents emergency department for weakness and malaise for over two weeks. Pt states has neck and back pain she believes is associated with sleeping on a couch.  Pt also c/p SHEA and significant decrease in PO intake. Denies fever, chills, CP, visual changes, dizziness, numbness, tingling.

## 2022-12-28 NOTE — PATIENT PROFILE ADULT - FALL HARM RISK - HARM RISK INTERVENTIONS

## 2022-12-28 NOTE — ED PROVIDER NOTE - OBJECTIVE STATEMENT
72-year-old female, past medical history anxiety, depression, hypertension, hyperlipidemia, presents emergency department for low potassium associated with weakness specific pain or radiation. Denies fever, chills, cp, sob, neck pain, visual changes, nvd, dizziness, numbness, tingling.

## 2022-12-28 NOTE — ED ADULT TRIAGE NOTE - CHIEF COMPLAINT QUOTE
BIBA, as per EMS "pt c/o weakness, dizziness, unable to walk, leg/hand cramping x2weeks. recently hospitalized for low potassium. pt vomited all last night", stroke code not called due to time frame

## 2022-12-28 NOTE — H&P ADULT - NSHPLABSRESULTS_GEN_ALL_CORE
12.2   12.05 )-----------( 247      ( 28 Dec 2022 11:42 )             36.2       12-28    140  |  99  |  19  ----------------------------<  103<H>  2.6<LL>   |  24  |  0.6<L>    Ca    5.3<LL>      28 Dec 2022 11:42  Mg     0.4     12-28    TPro  6.7  /  Alb  3.6  /  TBili  0.6  /  DBili  x   /  AST  32  /  ALT  23  /  AlkPhos  98  12-28          < from: Xray Chest 2 Views PA/Lat (12.12.22 @ 15:03) >    Impression:    No radiographic evidence of acute cardiopulmonary disease.      --- End of Report ---      ALEJANDRO HENDRIX MD; Attending Radiologist  This document has been electronically signed. Dec 12 2022  3:07PM    < end of copied text >

## 2022-12-28 NOTE — ED ADULT NURSE NOTE - CCCP TRG CHIEF CMPLNT
MD Chad Kruse, RN  Rise in PSA noted. Please fax a copy to Dr. Cali Romero, for ptâs upcoming appointment for f/up h/o prostate cancer.      Thanks,     TK multiple medical complaints

## 2022-12-28 NOTE — H&P ADULT - NS ATTEND AMEND GEN_ALL_CORE FT
# Generalized weakness and muscle spasms due to severe electrolyte imbalance  #HTN  # BPH  #GERD  #Anxiety    Activity as tolerated  Fall precautions  Drug adverse effects reviewed  Replace electrolytes IV and monitor  Monitor labs and vitals  DVT prophylaxis  resume home medications # Generalized weakness and muscle spasms due to severe electrolyte imbalance  #HTN  # BPH  #GERD  #Anxiety    Admit to telemetry  telemetry monitoring  Activity as tolerated  Fall precautions  No ekg changes  Nephrology consult  Drug adverse effects reviewed  Replace electrolytes IV and monitor  Monitor labs and vitals  DVT prophylaxis  resume home medications # Generalized weakness and muscle spasms due to severe electrolyte imbalance  #HTN  # BPH  #GERD  #Anxiety    Admit to telemetry  telemetry monitoring  Critical care cosult for ICU monitoring  Activity as tolerated  Fall precautions  Nephrology consult  Drug adverse effects reviewed  Replace electrolytes IV and monitor  Monitor labs and vitals, UA and blood cultures  DVT prophylaxis  resume home medications # Generalized weakness and muscle spasms due to severe electrolyte imbalance  #HTN  # BPH  #GERD  #Anxiety    Admit to telemetry  telemetry monitoring  Critical care cosult for ICU monitoring  Activity as tolerated  Fall precautions  Nephrology consult  Drug adverse effects reviewed  Replace electrolytes IV and monitor  Monitor labs and vitals, UA and blood cultures  DVT prophylaxis  resume home medications    ADDENDUM:; DISCUSSED WITH INTENSIVIST DR AZAR, PATIENT ACCEPTED TO ICU. TRANSFER ORDER PLACED.

## 2022-12-28 NOTE — ED PROVIDER NOTE - ATTENDING APP SHARED VISIT CONTRIBUTION OF CARE
pt has significant and sx electrolye disturbance and is at risk for cardiac arrhythmia.  + Trousseau' sign during BP reading.  baseline EKG is wnl.  Patient will be admitted to a monitored setting.

## 2022-12-28 NOTE — ED PROVIDER NOTE - CARE PLAN
1 Principal Discharge DX:	Hypokalemia  Secondary Diagnosis:	Hypocalcemia  Secondary Diagnosis:	Hypomagnesemia

## 2022-12-28 NOTE — H&P ADULT - NSHPREVIEWOFSYSTEMS_GEN_ALL_CORE
REVIEW OF SYSTEMS:    CONSTITUTIONAL: POSITIVE  weakness, NO fevers or chills  EYES/ENT: No visual changes;  No vertigo or throat pain   NECK: No pain or stiffness  RESPIRATORY: No cough, wheezing, hemoptysis; No shortness of breath  CARDIOVASCULAR: No chest pain or palpitations  GASTROINTESTINAL: No abdominal or epigastric pain. No nausea, vomiting, or hematemesis; No diarrhea or constipation. No melena or hematochezia.  GENITOURINARY: No dysuria, frequency or hematuria  NEUROLOGICAL: No numbness or weakness  SKIN: No itching, rashes

## 2022-12-29 LAB
A1C WITH ESTIMATED AVERAGE GLUCOSE RESULT: 6.1 % — HIGH (ref 4–5.6)
ALBUMIN SERPL ELPH-MCNC: 3.2 G/DL — LOW (ref 3.5–5.2)
ALP SERPL-CCNC: 100 U/L — SIGNIFICANT CHANGE UP (ref 30–115)
ALT FLD-CCNC: 21 U/L — SIGNIFICANT CHANGE UP (ref 0–41)
ANION GAP SERPL CALC-SCNC: 13 MMOL/L — SIGNIFICANT CHANGE UP (ref 7–14)
AST SERPL-CCNC: 30 U/L — SIGNIFICANT CHANGE UP (ref 0–41)
BASOPHILS # BLD AUTO: 0.02 K/UL — SIGNIFICANT CHANGE UP (ref 0–0.2)
BASOPHILS NFR BLD AUTO: 0.2 % — SIGNIFICANT CHANGE UP (ref 0–1)
BILIRUB SERPL-MCNC: 0.6 MG/DL — SIGNIFICANT CHANGE UP (ref 0.2–1.2)
BUN SERPL-MCNC: 12 MG/DL — SIGNIFICANT CHANGE UP (ref 10–20)
CALCIUM SERPL-MCNC: 6.2 MG/DL — LOW (ref 8.4–10.5)
CHLORIDE SERPL-SCNC: 104 MMOL/L — SIGNIFICANT CHANGE UP (ref 98–110)
CO2 SERPL-SCNC: 23 MMOL/L — SIGNIFICANT CHANGE UP (ref 17–32)
CREAT SERPL-MCNC: <0.5 MG/DL — LOW (ref 0.7–1.5)
EGFR: 104 ML/MIN/1.73M2 — SIGNIFICANT CHANGE UP
EOSINOPHIL # BLD AUTO: 0.08 K/UL — SIGNIFICANT CHANGE UP (ref 0–0.7)
EOSINOPHIL NFR BLD AUTO: 1 % — SIGNIFICANT CHANGE UP (ref 0–8)
ESTIMATED AVERAGE GLUCOSE: 128 MG/DL — HIGH (ref 68–114)
GLUCOSE SERPL-MCNC: 120 MG/DL — HIGH (ref 70–99)
HCT VFR BLD CALC: 33.1 % — LOW (ref 37–47)
HCV AB S/CO SERPL IA: 0.04 COI — SIGNIFICANT CHANGE UP
HCV AB SERPL-IMP: SIGNIFICANT CHANGE UP
HGB BLD-MCNC: 10.9 G/DL — LOW (ref 12–16)
IMM GRANULOCYTES NFR BLD AUTO: 0.2 % — SIGNIFICANT CHANGE UP (ref 0.1–0.3)
LACTATE SERPL-SCNC: 1.2 MMOL/L — SIGNIFICANT CHANGE UP (ref 0.7–2)
LYMPHOCYTES # BLD AUTO: 1.37 K/UL — SIGNIFICANT CHANGE UP (ref 1.2–3.4)
LYMPHOCYTES # BLD AUTO: 16.7 % — LOW (ref 20.5–51.1)
MAGNESIUM SERPL-MCNC: 1.7 MG/DL — LOW (ref 1.8–2.4)
MCHC RBC-ENTMCNC: 27.3 PG — SIGNIFICANT CHANGE UP (ref 27–31)
MCHC RBC-ENTMCNC: 32.9 G/DL — SIGNIFICANT CHANGE UP (ref 32–37)
MCV RBC AUTO: 83 FL — SIGNIFICANT CHANGE UP (ref 81–99)
MONOCYTES # BLD AUTO: 0.6 K/UL — SIGNIFICANT CHANGE UP (ref 0.1–0.6)
MONOCYTES NFR BLD AUTO: 7.3 % — SIGNIFICANT CHANGE UP (ref 1.7–9.3)
NEUTROPHILS # BLD AUTO: 6.1 K/UL — SIGNIFICANT CHANGE UP (ref 1.4–6.5)
NEUTROPHILS NFR BLD AUTO: 74.6 % — SIGNIFICANT CHANGE UP (ref 42.2–75.2)
NRBC # BLD: 0 /100 WBCS — SIGNIFICANT CHANGE UP (ref 0–0)
PLATELET # BLD AUTO: 250 K/UL — SIGNIFICANT CHANGE UP (ref 130–400)
POTASSIUM SERPL-MCNC: 3.5 MMOL/L — SIGNIFICANT CHANGE UP (ref 3.5–5)
POTASSIUM SERPL-SCNC: 3.5 MMOL/L — SIGNIFICANT CHANGE UP (ref 3.5–5)
PROCALCITONIN SERPL-MCNC: 3.48 NG/ML — HIGH (ref 0.02–0.1)
PROT SERPL-MCNC: 6 G/DL — SIGNIFICANT CHANGE UP (ref 6–8)
RAPID RVP RESULT: SIGNIFICANT CHANGE UP
RBC # BLD: 3.99 M/UL — LOW (ref 4.2–5.4)
RBC # FLD: 14.6 % — HIGH (ref 11.5–14.5)
SARS-COV-2 RNA SPEC QL NAA+PROBE: SIGNIFICANT CHANGE UP
SODIUM SERPL-SCNC: 140 MMOL/L — SIGNIFICANT CHANGE UP (ref 135–146)
WBC # BLD: 8.19 K/UL — SIGNIFICANT CHANGE UP (ref 4.8–10.8)
WBC # FLD AUTO: 8.19 K/UL — SIGNIFICANT CHANGE UP (ref 4.8–10.8)

## 2022-12-29 PROCEDURE — 99232 SBSQ HOSP IP/OBS MODERATE 35: CPT

## 2022-12-29 PROCEDURE — 93010 ELECTROCARDIOGRAM REPORT: CPT

## 2022-12-29 RX ORDER — MAGNESIUM OXIDE 400 MG ORAL TABLET 241.3 MG
400 TABLET ORAL
Refills: 0 | Status: DISCONTINUED | OUTPATIENT
Start: 2022-12-29 | End: 2022-12-31

## 2022-12-29 RX ORDER — POTASSIUM CHLORIDE 20 MEQ
40 PACKET (EA) ORAL EVERY 12 HOURS
Refills: 0 | Status: COMPLETED | OUTPATIENT
Start: 2022-12-29 | End: 2022-12-31

## 2022-12-29 RX ADMIN — Medication 325 MILLIGRAM(S): at 11:35

## 2022-12-29 RX ADMIN — Medication 1 TABLET(S): at 11:35

## 2022-12-29 RX ADMIN — Medication 650 MILLIGRAM(S): at 11:38

## 2022-12-29 RX ADMIN — Medication 650 MILLIGRAM(S): at 00:52

## 2022-12-29 RX ADMIN — Medication 0.25 MILLIGRAM(S): at 21:31

## 2022-12-29 RX ADMIN — ATORVASTATIN CALCIUM 80 MILLIGRAM(S): 80 TABLET, FILM COATED ORAL at 21:32

## 2022-12-29 RX ADMIN — Medication 650 MILLIGRAM(S): at 22:36

## 2022-12-29 RX ADMIN — Medication 650 MILLIGRAM(S): at 05:14

## 2022-12-29 RX ADMIN — Medication 40 MILLIEQUIVALENT(S): at 17:07

## 2022-12-29 RX ADMIN — Medication 0.25 MILLIGRAM(S): at 11:38

## 2022-12-29 RX ADMIN — Medication 3 MILLIGRAM(S): at 21:33

## 2022-12-29 RX ADMIN — Medication 1 TABLET(S): at 21:32

## 2022-12-29 RX ADMIN — AMLODIPINE BESYLATE 10 MILLIGRAM(S): 2.5 TABLET ORAL at 05:15

## 2022-12-29 RX ADMIN — Medication 650 MILLIGRAM(S): at 21:31

## 2022-12-29 RX ADMIN — MAGNESIUM OXIDE 400 MG ORAL TABLET 400 MILLIGRAM(S): 241.3 TABLET ORAL at 11:36

## 2022-12-29 RX ADMIN — MAGNESIUM OXIDE 400 MG ORAL TABLET 400 MILLIGRAM(S): 241.3 TABLET ORAL at 17:06

## 2022-12-29 RX ADMIN — ENOXAPARIN SODIUM 40 MILLIGRAM(S): 100 INJECTION SUBCUTANEOUS at 21:33

## 2022-12-29 NOTE — PROGRESS NOTE ADULT - SUBJECTIVE AND OBJECTIVE BOX
Patient reports she has not been eating well, vomited once yesterday      T(F): 97.8 (12-29-22 @ 07:05), Max: 98.6 (12-28-22 @ 20:00)  HR: 69 (12-29-22 @ 07:05)  BP: 115/58 (12-29-22 @ 07:05)  RR: 18 (12-29-22 @ 07:05)  SpO2: 94% (12-29-22 @ 07:05) (88% - 96%)    PHYSICAL EXAM:  GENERAL: NAD  HEAD:  Atraumatic, Normocephalic  EYES: EOMI, PERRLA, conjunctiva and sclera clear  NERVOUS SYSTEM:  Alert & Oriented X3, no focal deficits   CHEST/LUNG: Clear to percussion bilaterally; No rales, rhonchi, wheezing, or rubs  HEART: Regular rate and rhythm; No murmurs, rubs, or gallops  ABDOMEN: Soft, Nontender, Nondistended; Bowel sounds present  EXTREMITIES:  2+ Peripheral Pulses, No clubbing, cyanosis, or edema    LABS  12-29    140  |  104  |  12  ----------------------------<  120<H>  3.5   |  23  |  <0.5<L>    Ca    6.2<L>      29 Dec 2022 05:46  Mg       TPro  6.0  /  Alb  3.2<L>  /  TBili  0.6  /  DBili  x   /  AST  30  /  ALT  21  /  AlkPhos  100  12-29                          10.9   8.19  )-----------( 250      ( 29 Dec 2022 05:46 )             33.1         CARDIAC ENZYMES            RADIOLOGY    MEDICATIONS  (STANDING):  amLODIPine   Tablet 10 milliGRAM(s) Oral daily  aspirin enteric coated 325 milliGRAM(s) Oral daily  atorvastatin 80 milliGRAM(s) Oral at bedtime  calcium carbonate 1250 mG  + Vitamin D (OsCal 500 + D) 1 Tablet(s) Oral every 8 hours  enoxaparin Injectable 40 milliGRAM(s) SubCutaneous every 24 hours  magnesium oxide 400 milliGRAM(s) Oral three times a day with meals  potassium chloride   Powder 40 milliEquivalent(s) Oral every 12 hours    MEDICATIONS  (PRN):  acetaminophen     Tablet .. 650 milliGRAM(s) Oral every 6 hours PRN Temp greater or equal to 38C (100.4F), Mild Pain (1 - 3)  ALPRAZolam 0.25 milliGRAM(s) Oral three times a day PRN anxiety do  melatonin 3 milliGRAM(s) Oral at bedtime PRN Insomnia  ondansetron Injectable 4 milliGRAM(s) IV Push every 8 hours PRN Nausea and/or Vomiting      Patient reports she has not been eating well, vomited once yesterday      T(F): 97.8 (12-29-22 @ 07:05), Max: 98.6 (12-28-22 @ 20:00)  HR: 69 (12-29-22 @ 07:05)  BP: 115/58 (12-29-22 @ 07:05)  RR: 18 (12-29-22 @ 07:05)  SpO2: 94% (12-29-22 @ 07:05) (88% - 96%)    PHYSICAL EXAM:  GENERAL: NAD  HEAD:  Atraumatic, Normocephalic  EYES: EOMI, PERRLA, conjunctiva and sclera clear  NERVOUS SYSTEM:  Alert & Oriented X3, no focal deficits   CHEST/LUNG: Clear to percussion bilaterally; No rales, rhonchi, wheezing, or rubs  HEART: Regular rate and rhythm; No murmurs, rubs, or gallops  ABDOMEN: Soft, Nontender, Nondistended; Bowel sounds present  EXTREMITIES:  2+ Peripheral Pulses, No clubbing, cyanosis, or edema    LABS  12-29    140  |  104  |  12  ----------------------------<  120<H>  3.5   |  23  |  <0.5<L>    Ca    6.2<L>      29 Dec 2022 05:46  Mg  1.7    TPro  6.0  /  Alb  3.2<L>  /  TBili  0.6  /  DBili  x   /  AST  30  /  ALT  21  /  AlkPhos  100  12-29                          10.9   8.19  )-----------( 250      ( 29 Dec 2022 05:46 )             33.1     < from: 12 Lead ECG (12.28.22 @ 11:44) >  Ventricular Rate 75 BPM    Atrial Rate 75 BPM    P-R Interval 168 ms    QRS Duration 70 ms    Q-T Interval 418 ms    QTC Calculation(Bazett) 466 ms    P Axis 30 degrees    R Axis 0 degrees    T Axis 19 degrees    Diagnosis Line Normal sinus rhythm  Normal ECG    < end of copied text >    RADIOLOGY  < from: Xray Chest 2 Views PA/Lat (12.12.22 @ 15:03) >  Impression:    No radiographic evidence of acute cardiopulmonary disease.      < end of copied text >    MEDICATIONS  (STANDING):  amLODIPine   Tablet 10 milliGRAM(s) Oral daily  aspirin enteric coated 325 milliGRAM(s) Oral daily  atorvastatin 80 milliGRAM(s) Oral at bedtime  calcium carbonate 1250 mG  + Vitamin D (OsCal 500 + D) 1 Tablet(s) Oral every 8 hours  enoxaparin Injectable 40 milliGRAM(s) SubCutaneous every 24 hours  magnesium oxide 400 milliGRAM(s) Oral three times a day with meals  potassium chloride   Powder 40 milliEquivalent(s) Oral every 12 hours    MEDICATIONS  (PRN):  acetaminophen     Tablet .. 650 milliGRAM(s) Oral every 6 hours PRN Temp greater or equal to 38C (100.4F), Mild Pain (1 - 3)  ALPRAZolam 0.25 milliGRAM(s) Oral three times a day PRN anxiety do  melatonin 3 milliGRAM(s) Oral at bedtime PRN Insomnia  ondansetron Injectable 4 milliGRAM(s) IV Push every 8 hours PRN Nausea and/or Vomiting

## 2022-12-29 NOTE — PROGRESS NOTE ADULT - ASSESSMENT
72-year-old female, past medical history CVA (2017) anxiety, depression, hypertension, hyperlipidemia, presents emergency department for weakness and malaise for over two weeks. Pt reports she has not been eating and vomited yesterday    Multiple electrolyte imbalance / hypokalemia / hypomagnesemia / hypocalcemia   72-year-old female, past medical history CVA (2017) anxiety, depression, hypertension, hyperlipidemia, presents emergency department for weakness and malaise for over two weeks. Pt reports she has not been eating and vomited yesterday    Multiple electrolyte imbalance / hypokalemia / hypomagnesemia / hypocalcemia     - lytes improving with supplementation   - continue oral K, mg and calcium   - follow repeat chem in am   - hold Norvasc as bp low   - pt   - no need for ICU   - DVT prophylaxis   72-year-old female, past medical history CVA (2017) anxiety, depression, hypertension, hyperlipidemia, presents emergency department for weakness and malaise for over two weeks. Pt reports she has not been eating and vomited yesterday    Multiple electrolyte imbalance / hypokalemia / hypomagnesemia / hypocalcemia     - lytes improving with supplementation   - continue oral K, mg and calcium   - follow repeat chem in am   - check TSH   - hold Norvasc as bp low   - pt   - no need for ICU   - DVT prophylaxis   72-year-old female, past medical history CVA (2017) anxiety, depression, hypertension, hyperlipidemia, presents emergency department for weakness and malaise for over two weeks. Pt reports she has not been eating and vomited yesterday    Multiple electrolyte imbalance / hypokalemia / hypomagnesemia / hypocalcemia     - lytes improving with supplementation   - continue oral K, mg and calcium   - follow repeat chem in am   - check TSH, amylase and  Lipase   - hold Norvasc as bp low   - pt   - no need for ICU   - DVT prophylaxis

## 2022-12-30 LAB
24R-OH-CALCIDIOL SERPL-MCNC: 42 NG/ML — SIGNIFICANT CHANGE UP (ref 30–80)
ALBUMIN SERPL ELPH-MCNC: 3.2 G/DL — LOW (ref 3.5–5.2)
ALP SERPL-CCNC: 108 U/L — SIGNIFICANT CHANGE UP (ref 30–115)
ALT FLD-CCNC: 21 U/L — SIGNIFICANT CHANGE UP (ref 0–41)
AMYLASE P1 CFR SERPL: 32 U/L — SIGNIFICANT CHANGE UP (ref 25–115)
ANION GAP SERPL CALC-SCNC: 11 MMOL/L — SIGNIFICANT CHANGE UP (ref 7–14)
AST SERPL-CCNC: 30 U/L — SIGNIFICANT CHANGE UP (ref 0–41)
BILIRUB SERPL-MCNC: 0.4 MG/DL — SIGNIFICANT CHANGE UP (ref 0.2–1.2)
BUN SERPL-MCNC: 9 MG/DL — LOW (ref 10–20)
CALCIUM SERPL-MCNC: 7.7 MG/DL — LOW (ref 8.4–10.5)
CHLORIDE SERPL-SCNC: 109 MMOL/L — SIGNIFICANT CHANGE UP (ref 98–110)
CO2 SERPL-SCNC: 21 MMOL/L — SIGNIFICANT CHANGE UP (ref 17–32)
CREAT SERPL-MCNC: <0.5 MG/DL — LOW (ref 0.7–1.5)
EGFR: 104 ML/MIN/1.73M2 — SIGNIFICANT CHANGE UP
GLUCOSE SERPL-MCNC: 94 MG/DL — SIGNIFICANT CHANGE UP (ref 70–99)
HCT VFR BLD CALC: 36.5 % — LOW (ref 37–47)
HGB BLD-MCNC: 12 G/DL — SIGNIFICANT CHANGE UP (ref 12–16)
LIDOCAIN IGE QN: 25 U/L — SIGNIFICANT CHANGE UP (ref 7–60)
MAGNESIUM SERPL-MCNC: 1.4 MG/DL — LOW (ref 1.8–2.4)
MCHC RBC-ENTMCNC: 27.5 PG — SIGNIFICANT CHANGE UP (ref 27–31)
MCHC RBC-ENTMCNC: 32.9 G/DL — SIGNIFICANT CHANGE UP (ref 32–37)
MCV RBC AUTO: 83.7 FL — SIGNIFICANT CHANGE UP (ref 81–99)
NRBC # BLD: 0 /100 WBCS — SIGNIFICANT CHANGE UP (ref 0–0)
PLATELET # BLD AUTO: 285 K/UL — SIGNIFICANT CHANGE UP (ref 130–400)
POTASSIUM SERPL-MCNC: 4.6 MMOL/L — SIGNIFICANT CHANGE UP (ref 3.5–5)
POTASSIUM SERPL-SCNC: 4.6 MMOL/L — SIGNIFICANT CHANGE UP (ref 3.5–5)
PROT SERPL-MCNC: 6.1 G/DL — SIGNIFICANT CHANGE UP (ref 6–8)
RBC # BLD: 4.36 M/UL — SIGNIFICANT CHANGE UP (ref 4.2–5.4)
RBC # FLD: 15.1 % — HIGH (ref 11.5–14.5)
SODIUM SERPL-SCNC: 141 MMOL/L — SIGNIFICANT CHANGE UP (ref 135–146)
TSH SERPL-MCNC: 0.86 UIU/ML — SIGNIFICANT CHANGE UP (ref 0.27–4.2)
WBC # BLD: 6.26 K/UL — SIGNIFICANT CHANGE UP (ref 4.8–10.8)
WBC # FLD AUTO: 6.26 K/UL — SIGNIFICANT CHANGE UP (ref 4.8–10.8)

## 2022-12-30 PROCEDURE — 70450 CT HEAD/BRAIN W/O DYE: CPT | Mod: 26

## 2022-12-30 PROCEDURE — 99233 SBSQ HOSP IP/OBS HIGH 50: CPT

## 2022-12-30 RX ORDER — MAGNESIUM SULFATE 500 MG/ML
1 VIAL (ML) INJECTION
Refills: 0 | Status: COMPLETED | OUTPATIENT
Start: 2022-12-30 | End: 2022-12-30

## 2022-12-30 RX ORDER — POLYETHYLENE GLYCOL 3350 17 G/17G
17 POWDER, FOR SOLUTION ORAL DAILY
Refills: 0 | Status: DISCONTINUED | OUTPATIENT
Start: 2022-12-30 | End: 2022-12-31

## 2022-12-30 RX ORDER — FAMOTIDINE 10 MG/ML
20 INJECTION INTRAVENOUS DAILY
Refills: 0 | Status: DISCONTINUED | OUTPATIENT
Start: 2022-12-30 | End: 2022-12-31

## 2022-12-30 RX ORDER — MAGNESIUM SULFATE 500 MG/ML
2 VIAL (ML) INJECTION ONCE
Refills: 0 | Status: DISCONTINUED | OUTPATIENT
Start: 2022-12-30 | End: 2022-12-30

## 2022-12-30 RX ADMIN — MAGNESIUM OXIDE 400 MG ORAL TABLET 400 MILLIGRAM(S): 241.3 TABLET ORAL at 09:44

## 2022-12-30 RX ADMIN — Medication 1 TABLET(S): at 15:52

## 2022-12-30 RX ADMIN — Medication 650 MILLIGRAM(S): at 05:09

## 2022-12-30 RX ADMIN — Medication 40 MILLIEQUIVALENT(S): at 05:10

## 2022-12-30 RX ADMIN — Medication 650 MILLIGRAM(S): at 22:26

## 2022-12-30 RX ADMIN — MAGNESIUM OXIDE 400 MG ORAL TABLET 400 MILLIGRAM(S): 241.3 TABLET ORAL at 12:05

## 2022-12-30 RX ADMIN — Medication 1 TABLET(S): at 22:26

## 2022-12-30 RX ADMIN — Medication 1 TABLET(S): at 05:09

## 2022-12-30 RX ADMIN — ENOXAPARIN SODIUM 40 MILLIGRAM(S): 100 INJECTION SUBCUTANEOUS at 22:26

## 2022-12-30 RX ADMIN — MAGNESIUM OXIDE 400 MG ORAL TABLET 400 MILLIGRAM(S): 241.3 TABLET ORAL at 18:18

## 2022-12-30 RX ADMIN — Medication 100 GRAM(S): at 14:45

## 2022-12-30 RX ADMIN — ATORVASTATIN CALCIUM 80 MILLIGRAM(S): 80 TABLET, FILM COATED ORAL at 22:26

## 2022-12-30 RX ADMIN — Medication 40 MILLIEQUIVALENT(S): at 18:19

## 2022-12-30 RX ADMIN — FAMOTIDINE 20 MILLIGRAM(S): 10 INJECTION INTRAVENOUS at 12:31

## 2022-12-30 RX ADMIN — Medication 0.25 MILLIGRAM(S): at 22:26

## 2022-12-30 RX ADMIN — Medication 650 MILLIGRAM(S): at 06:26

## 2022-12-30 RX ADMIN — Medication 100 GRAM(S): at 15:52

## 2022-12-30 RX ADMIN — Medication 10 MILLIGRAM(S): at 12:31

## 2022-12-30 RX ADMIN — Medication 650 MILLIGRAM(S): at 23:00

## 2022-12-30 RX ADMIN — POLYETHYLENE GLYCOL 3350 17 GRAM(S): 17 POWDER, FOR SOLUTION ORAL at 12:31

## 2022-12-30 RX ADMIN — Medication 325 MILLIGRAM(S): at 12:05

## 2022-12-30 NOTE — PHYSICAL THERAPY INITIAL EVALUATION ADULT - PERTINENT HX OF CURRENT PROBLEM, REHAB EVAL
72-year-old female, past medical history CVA (2017) anxiety, depression, hypertension, hyperlipidemia, presents emergency department for weakness and malaise for over two weeks.  Admitted for Multiple electrolyte imbalance.

## 2022-12-30 NOTE — CONSULT NOTE ADULT - SUBJECTIVE AND OBJECTIVE BOX
NEPHROLOGY CONSULTATION NOTE    73 yo female with PMH of HTN, neck and back pain, GERD, anxiety, TIA, depression presents weakness, dizziness, muscle tetany and cramping in hands, chest pain.  Pt had similar episode presenting to ED earlier this month and was found to have low K+.  PT states she is stressed from caring for her ill daughter.  Pt with significant electrolyte abnormalities on admission, now resolved with resolution of all symptoms.  No diarrhea, less PO intake, minimal n/v, no diarrhea, no ETOH abuse, no diuretics.  On PPI, but no active GI symptoms.    PAST MEDICAL & SURGICAL HISTORY:  Benign hypertension without CHF      H/O anxiety disorder      Chronic GERD      After-cataract of both eyes      Transient ischemic attack (TIA)  3/2017      Cataract extraction status of eye, right        Allergies:  No Known Allergies    Home Medications Reviewed    SOCIAL HISTORY:  Denies ETOH,Smoking,   FAMILY HISTORY:        REVIEW OF SYSTEMS currently:  CONSTITUTIONAL: No weakness, fevers or chills  EYES/ENT: No visual changes;  No vertigo or throat pain   NECK: No pain or stiffness  RESPIRATORY: No cough, wheezing, hemoptysis; No shortness of breath  CARDIOVASCULAR: No chest pain or palpitations.  GASTROINTESTINAL: No abdominal or epigastric pain. No nausea, vomiting, or hematemesis; No diarrhea or constipation. No melena or hematochezia.  GENITOURINARY: No dysuria, frequency, foamy urine, urinary urgency, incontinence or hematuria  NEUROLOGICAL: No numbness or weakness  SKIN: No itching, burning, rashes, or lesions   VASCULAR: No bilateral lower extremity edema.   All other review of systems is negative unless indicated above.    PHYSICAL EXAM:  Constitutional: NAD  HEENT: anicteric sclera, oropharynx clear, MMM  Neck: No JVD  Respiratory: CTAB, no wheezes, rales or rhonchi  Cardiovascular: S1, S2, RRR  Gastrointestinal: BS+, soft, NT/ND  Extremities: No cyanosis or clubbing. No peripheral edema  Neurological: A/O x 3, no focal deficits  Psychiatric: Normal mood, normal affect  : No CVA tenderness. No leal.   Skin: No rashes    Hospital Medications:   MEDICATIONS  (STANDING):  aspirin enteric coated 325 milliGRAM(s) Oral daily  atorvastatin 80 milliGRAM(s) Oral at bedtime  calcium carbonate 1250 mG  + Vitamin D (OsCal 500 + D) 1 Tablet(s) Oral every 8 hours  enoxaparin Injectable 40 milliGRAM(s) SubCutaneous every 24 hours  magnesium oxide 400 milliGRAM(s) Oral three times a day with meals  potassium chloride   Powder 40 milliEquivalent(s) Oral every 12 hours        VITALS:  T(F): 98 (22 @ 05:00), Max: 98 (22 @ 05:00)  HR: 87 (22 @ 08:39)  BP: 125/59 (22 @ 08:39)  RR: 16 (22 @ 05:00)  SpO2: 93% (22 @ 08:39)  Wt(kg): --     @ 07:01  -   @ 07:00  --------------------------------------------------------  IN: 900 mL / OUT: 0 mL / NET: 900 mL      Height (cm): 162.6 ( @ 14:36)  Weight (kg): 68.3 ( @ 14:36)  BMI (kg/m2): 25.8 ( @ 14:36)  BSA (m2): 1.73 ( @ 14:36)    LABS:      141  |  109  |  9<L>  ----------------------------<  94  4.6   |  21  |  <0.5<L>    Ca    7.7<L>      30 Dec 2022 08:10  Mg     1.4         TPro  6.1  /  Alb  3.2<L>  /  TBili  0.4  /  DBili      /  AST  30  /  ALT  21  /  AlkPhos  108                            12.0   6.26  )-----------( 285      ( 30 Dec 2022 08:10 )             36.5       Urine Studies:  Urinalysis Basic - ( 28 Dec 2022 20:50 )    Color: Yellow / Appearance: Clear / S.025 / pH:   Gluc:  / Ketone: 40  / Bili: Small / Urobili: 2.0 mg/dL   Blood:  / Protein: 30 mg/dL / Nitrite: Negative   Leuk Esterase: Negative / RBC: Negative / WBC 1-2 /HPF   Sq Epi:  / Non Sq Epi: Few /HPF / Bacteria: Few          RADIOLOGY & ADDITIONAL STUDIES:

## 2022-12-30 NOTE — CONSULT NOTE ADULT - ASSESSMENT
symptomatic (tetany) severe hypomagnesemia    - no significant n/v, diarrhea, ETOH, diuretic use   - etiology probably due to PPI  hypokalemia and hypocalcemia likely secondary to severe hypomagnesemia   anxiety / depression  HTN / hx of TIA  asymptomatic GERD  hx of vit D deficiency    plan:    keep off omeprazole  start pepcid 20mg po qd  cont mag-ox 400mg po tid  give Mag SO4 2g iv x 1  KCl 20meq po qd  cont vit D / calcium carbonate  check FeMg++ - ordered UMg++ and UCr  check iPTH, 25vit D, 1,25Vit D - can be followed as outpt  pt and son educated / counselled on avoidance of PPI, outpt f/u in 1-2 weeks with repeat labs           symptomatic (tetany) severe hypomagnesemia    - no significant n/v, diarrhea, ETOH, diuretic use   - etiology probably due to PPI, aggravated by poor po intake, emesis  hypokalemia and hypocalcemia likely secondary to severe hypomagnesemia   anxiety / depression  HTN / hx of TIA  asymptomatic GERD  hx of vit D deficiency    plan:    keep off omeprazole  start pepcid 20mg po qd  cont mag-ox 400mg po tid  give Mag SO4 2g iv x 1  KCl 20meq po qd  cont vit D / calcium carbonate  check FeMg++ - ordered UMg++ and UCr  check iPTH, 25vit D, 1,25Vit D - can be followed as outpt  pt and son educated / counselled on avoidance of PPI, outpt f/u in 1-2 weeks with repeat labs

## 2022-12-30 NOTE — PHYSICAL THERAPY INITIAL EVALUATION ADULT - ADDITIONAL COMMENTS
pt lives in a private house with her son (lives upstairs) and her disabled daughter maxwell lives with her.  6 steps to get into the house with rails and 1 step down to the one level apartment.  pt was independent community ambulator prior to admission prior to admission. 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

## 2022-12-30 NOTE — PHYSICAL THERAPY INITIAL EVALUATION ADULT - GENERAL OBSERVATIONS, REHAB EVAL
9;30-10;05 pt was seen for PT IE at bed side, pt is agreeable, chart thoroughly reviewed, RN Medina is aware.  Pt received and left semi dash in bed, in no apparent distress, +hep lock, +call bell within reach, bed side table at reach,

## 2022-12-30 NOTE — PROGRESS NOTE ADULT - ASSESSMENT
a 72-year-old female, past medical history CVA (2017) anxiety, depression, hypertension, hyperlipidemia, presents emergency department for weakness and malaise for over two weeks. Pt reports she has not been eating and vomited yesterday, endorsed shes taken care of her sick daughter and has been in a lot of stress, patient was found to have multiple electrolytes abnormality, No radiographic evidence of acute cardiopulmonary disease, ct head showed Small age indeterminate infarct within the right cerebellar hemisphere, Chronic infarcts in the left parietal and occipital lobes.  No acute intracranial hemorrhage. patient was started on mag, kcl, phosph and iv fluid on admission, evaluated by icu and nephrology, concern for hypomag as per nephrology likely in setting of PPI which is now discontinued.  patient kept in medicine floor, feels great today, lytes improved, PT to evaluate patient for safe dc, baseline at home uses walker. will monitor until tomorrow then send home.    Multiple electrolyte imbalance / hypokalemia / hypomagnesemia / hypocalcemia  generalized weakness  poor appetite   hx CVA  depression / anxiety   HTN/HLD    Plan:  - replete lytes as needed mag >2, k >4, phosph >2.5   - Continue Iv fluids as needed  - Nephrology, and Neurology Rec's appr.   - PT/rehab, SLP,   - tylenol and pain management   - continue current medication, antihypertensive (with holding parameters),  - monitor vitals closely, daily am labs  - precaution (fall/aspiration/seizure)    DVT prophylaxis: lovenox   GI prophylaxis: Protonix   diet: HH diet   code status: full code  Goals of care/disposition: Home     #Progress Note Handoff  Pending (specify):  dc home tomorrow   Disposition: home

## 2022-12-30 NOTE — PROGRESS NOTE ADULT - SUBJECTIVE AND OBJECTIVE BOX
JOHNNY LEMONS 72y Female  MRN#: [id number]   Hospital Day: 2d    SUBJECTIVE  Patient is a 72y old Female who presents with a chief complaint of Weakness and general malaise (30 Dec 2022 11:31)  Currently admitted to medicine with the primary diagnosis of Hypokalemia      INTERVAL HPI AND OVERNIGHT EVENTS:  Patient was examined and seen at bedside. This morning she is resting comfortably in bed and reports no issues or overnight events.  feels much better, feels a little weak otherwise better, denies chest pain , sob, n/v/d/c, hematuria or bloody stool.     REVIEW OF SYMPTOMS:  10 point ROS negative except as above.    OBJECTIVE  PAST MEDICAL & SURGICAL HISTORY  Benign hypertension without CHF    H/O anxiety disorder    Chronic GERD    After-cataract of both eyes    Transient ischemic attack (TIA)  3/2017    Cataract extraction status of eye, right      ALLERGIES:  No Known Allergies    MEDICATIONS:  STANDING MEDICATIONS  aspirin enteric coated 325 milliGRAM(s) Oral daily  atorvastatin 80 milliGRAM(s) Oral at bedtime  bisacodyl 10 milliGRAM(s) Oral daily  calcium carbonate 1250 mG  + Vitamin D (OsCal 500 + D) 1 Tablet(s) Oral every 8 hours  enoxaparin Injectable 40 milliGRAM(s) SubCutaneous every 24 hours  famotidine    Tablet 20 milliGRAM(s) Oral daily  magnesium oxide 400 milliGRAM(s) Oral three times a day with meals  magnesium sulfate  IVPB 1 Gram(s) IV Intermittent every 1 hour  polyethylene glycol 3350 17 Gram(s) Oral daily  potassium chloride   Powder 40 milliEquivalent(s) Oral every 12 hours    PRN MEDICATIONS  acetaminophen     Tablet .. 650 milliGRAM(s) Oral every 6 hours PRN  ALPRAZolam 0.25 milliGRAM(s) Oral three times a day PRN  melatonin 3 milliGRAM(s) Oral at bedtime PRN  ondansetron Injectable 4 milliGRAM(s) IV Push every 8 hours PRN      VITAL SIGNS: Last 24 Hours  T(C): 36.7 (30 Dec 2022 05:00), Max: 36.7 (30 Dec 2022 05:00)  T(F): 98 (30 Dec 2022 05:00), Max: 98 (30 Dec 2022 05:00)  HR: 87 (30 Dec 2022 08:39) (78 - 87)  BP: 125/59 (30 Dec 2022 08:39) (112/68 - 144/70)  BP(mean): --  RR: 16 (30 Dec 2022 05:00) (16 - 18)  SpO2: 93% (30 Dec 2022 08:39) (91% - 99%)    PHYSICAL EXAM:  GENERAL: NAD, well-groomed, well-developed  HEENT - NC/AT,   NECK: Supple, No JVD  CHEST/LUNG: Clear to auscultation bilaterally; No rales, rhonchi, wheezing  HEART: Regular rate and rhythm; No murmurs, rubs, or gallops  ABDOMEN: Soft, Nontender, Nondistended; Bowel sounds present  EXTREMITIES:  2+ Peripheral Pulses, No clubbing, cyanosis, or edema  NEURO:  No Focal deficits, sensory and motor intact  SKIN: No rashes or lesions    LABS:                        12.0   6.26  )-----------( 285      ( 30 Dec 2022 08:10 )             36.5     12-30    141  |  109  |  9<L>  ----------------------------<  94  4.6   |  21  |  <0.5<L>    Ca    7.7<L>      30 Dec 2022 08:10  Mg     1.4     12-    TPro  6.1  /  Alb  3.2<L>  /  TBili  0.4  /  DBili  x   /  AST  30  /  ALT  21  /  AlkPhos  108  12-30      Urinalysis Basic - ( 28 Dec 2022 20:50 )    Color: Yellow / Appearance: Clear / S.025 / pH: x  Gluc: x / Ketone: 40  / Bili: Small / Urobili: 2.0 mg/dL   Blood: x / Protein: 30 mg/dL / Nitrite: Negative   Leuk Esterase: Negative / RBC: Negative / WBC 1-2 /HPF   Sq Epi: x / Non Sq Epi: Few /HPF / Bacteria: Few      RADIOLOGY:    < from: Xray Chest 2 Views PA/Lat (22 @ 15:03) >    Impression:    No radiographic evidence of acute cardiopulmonary disease.      < end of copied text >  < from: CT Head No Cont (22 @ 09:31) >  IMPRESSION:    1.  Small age indeterminate infarct within the right cerebellar   hemisphere (new since 2017).    2.  Chronic infarcts in the left parietal and occipital lobes.    3.  No acute intracranial hemorrhage.    < end of copied text >

## 2022-12-31 ENCOUNTER — TRANSCRIPTION ENCOUNTER (OUTPATIENT)
Age: 72
End: 2022-12-31

## 2022-12-31 VITALS
OXYGEN SATURATION: 96 % | HEART RATE: 92 BPM | RESPIRATION RATE: 18 BRPM | DIASTOLIC BLOOD PRESSURE: 90 MMHG | SYSTOLIC BLOOD PRESSURE: 142 MMHG | TEMPERATURE: 97 F

## 2022-12-31 LAB
ANION GAP SERPL CALC-SCNC: 11 MMOL/L — SIGNIFICANT CHANGE UP (ref 7–14)
BUN SERPL-MCNC: 13 MG/DL — SIGNIFICANT CHANGE UP (ref 10–20)
CALCIUM SERPL-MCNC: 9.6 MG/DL — SIGNIFICANT CHANGE UP (ref 8.4–10.5)
CHLORIDE SERPL-SCNC: 104 MMOL/L — SIGNIFICANT CHANGE UP (ref 98–110)
CO2 SERPL-SCNC: 23 MMOL/L — SIGNIFICANT CHANGE UP (ref 17–32)
CREAT SERPL-MCNC: 0.5 MG/DL — LOW (ref 0.7–1.5)
EGFR: 98 ML/MIN/1.73M2 — SIGNIFICANT CHANGE UP
GLUCOSE SERPL-MCNC: 125 MG/DL — HIGH (ref 70–99)
HCT VFR BLD CALC: 38.6 % — SIGNIFICANT CHANGE UP (ref 37–47)
HGB BLD-MCNC: 12.5 G/DL — SIGNIFICANT CHANGE UP (ref 12–16)
MAGNESIUM SERPL-MCNC: 1.8 MG/DL — SIGNIFICANT CHANGE UP (ref 1.8–2.4)
MCHC RBC-ENTMCNC: 27.5 PG — SIGNIFICANT CHANGE UP (ref 27–31)
MCHC RBC-ENTMCNC: 32.4 G/DL — SIGNIFICANT CHANGE UP (ref 32–37)
MCV RBC AUTO: 85 FL — SIGNIFICANT CHANGE UP (ref 81–99)
NRBC # BLD: 0 /100 WBCS — SIGNIFICANT CHANGE UP (ref 0–0)
PHOSPHATE SERPL-MCNC: 1.4 MG/DL — LOW (ref 2.1–4.9)
PLATELET # BLD AUTO: 308 K/UL — SIGNIFICANT CHANGE UP (ref 130–400)
POTASSIUM SERPL-MCNC: 5 MMOL/L — SIGNIFICANT CHANGE UP (ref 3.5–5)
POTASSIUM SERPL-SCNC: 5 MMOL/L — SIGNIFICANT CHANGE UP (ref 3.5–5)
RBC # BLD: 4.54 M/UL — SIGNIFICANT CHANGE UP (ref 4.2–5.4)
RBC # FLD: 15.1 % — HIGH (ref 11.5–14.5)
SODIUM SERPL-SCNC: 138 MMOL/L — SIGNIFICANT CHANGE UP (ref 135–146)
VIT D25+D1,25 OH+D1,25 PNL SERPL-MCNC: 271.5 PG/ML — HIGH (ref 19.9–79.3)
WBC # BLD: 8.25 K/UL — SIGNIFICANT CHANGE UP (ref 4.8–10.8)
WBC # FLD AUTO: 8.25 K/UL — SIGNIFICANT CHANGE UP (ref 4.8–10.8)

## 2022-12-31 PROCEDURE — 99239 HOSP IP/OBS DSCHRG MGMT >30: CPT

## 2022-12-31 RX ORDER — MAGNESIUM OXIDE 400 MG ORAL TABLET 241.3 MG
1 TABLET ORAL
Qty: 9 | Refills: 0
Start: 2022-12-31 | End: 2023-01-02

## 2022-12-31 RX ORDER — FAMOTIDINE 10 MG/ML
1 INJECTION INTRAVENOUS
Qty: 30 | Refills: 1
Start: 2022-12-31

## 2022-12-31 RX ORDER — OMEPRAZOLE 10 MG/1
1 CAPSULE, DELAYED RELEASE ORAL
Qty: 0 | Refills: 0 | DISCHARGE

## 2022-12-31 RX ORDER — MAGNESIUM SULFATE 500 MG/ML
2 VIAL (ML) INJECTION
Refills: 0 | Status: DISCONTINUED | OUTPATIENT
Start: 2022-12-31 | End: 2022-12-31

## 2022-12-31 RX ADMIN — MAGNESIUM OXIDE 400 MG ORAL TABLET 400 MILLIGRAM(S): 241.3 TABLET ORAL at 12:38

## 2022-12-31 RX ADMIN — Medication 1 TABLET(S): at 05:23

## 2022-12-31 RX ADMIN — MAGNESIUM OXIDE 400 MG ORAL TABLET 400 MILLIGRAM(S): 241.3 TABLET ORAL at 09:34

## 2022-12-31 RX ADMIN — Medication 325 MILLIGRAM(S): at 12:37

## 2022-12-31 RX ADMIN — Medication 1 TABLET(S): at 15:50

## 2022-12-31 RX ADMIN — FAMOTIDINE 20 MILLIGRAM(S): 10 INJECTION INTRAVENOUS at 12:38

## 2022-12-31 RX ADMIN — Medication 40 MILLIEQUIVALENT(S): at 05:23

## 2022-12-31 NOTE — DISCHARGE NOTE PROVIDER - NSDCMRMEDTOKEN_GEN_ALL_CORE_FT
amLODIPine 10 mg oral tablet: 1 tab(s) orally once a day  Aspirin Enteric Coated 325 mg oral delayed release tablet: 1 tab(s) orally once a day  atorvastatin 80 mg oral tablet: 1 tab(s) orally once a day  cyclobenzaprine 5 mg oral tablet: 1 tab(s) orally 3 times a day   famotidine 20 mg oral tablet: 1 tab(s) orally once a day  K-Tab 20 mEq oral tablet, extended release: 1 tab(s) orally 2 times a day   Xanax 0.25 mg oral tablet: 1 tab(s) orally 3 times a day   amLODIPine 10 mg oral tablet: 1 tab(s) orally once a day  Aspirin Enteric Coated 325 mg oral delayed release tablet: 1 tab(s) orally once a day  atorvastatin 80 mg oral tablet: 1 tab(s) orally once a day  calcium-vitamin D 500 mg-5 mcg (200 intl units) oral tablet: 1 tab(s) orally once a day   famotidine 20 mg oral tablet: 1 tab(s) orally once a day  magnesium oxide 400 mg oral tablet: 1 tab(s) orally 3 times a day (with meals)   Xanax 0.25 mg oral tablet: 1 tab(s) orally 3 times a day

## 2022-12-31 NOTE — DISCHARGE NOTE PROVIDER - HOSPITAL COURSE
a 72-year-old female, past medical history CVA (2017) anxiety, depression, hypertension, hyperlipidemia, presents emergency department for weakness and malaise for over two weeks. Pt reports she has not been eating and vomited yesterday, endorsed shes taken care of her sick daughter and has been in a lot of stress, patient was found to have multiple electrolytes abnormality, No radiographic evidence of acute cardiopulmonary disease, ct head showed Small age indeterminate infarct within the right cerebellar hemisphere, Chronic infarcts in the left parietal and occipital lobes.  No acute intracranial hemorrhage. patient was started on mag, kcl, phosph and iv fluid on admission, evaluated by icu and nephrology, concern for hypomagnesemia as per nephrology likely in setting of PPI which is now discontinued.  patient kept in medicine floor, feels great today, lytes improved, PT to evaluate patient for safe dc, baseline at home uses walker. will monitor until tomorrow then send home.    Multiple electrolyte imbalance / hypokalemia / hypomagnesemia / hypocalcemia  generalized weakness  poor appetite   hx CVA  depression / anxiety   HTN/HLD    Plan:  - repleted lytes as needed mag >2, k >4, phosph >2.5   - Continued Iv fluids as needed while hospitalized  - Nephrology, and Neurology consulted.   - PT/rehab, SLP,   - tylenol and pain management   - continue current medication, antihypertensive (with holding parameters),  - monitor vitals closely, daily am labs  - precaution (fall/aspiration/seizure)     a 72-year-old female, past medical history CVA (2017) anxiety, depression, hypertension, hyperlipidemia, presents emergency department for weakness and malaise for over two weeks. Pt reports she has not been eating and vomited yesterday, endorsed shes taken care of her sick daughter and has been in a lot of stress, patient was found to have multiple electrolytes abnormality, No radiographic evidence of acute cardiopulmonary disease, ct head showed Small age indeterminate infarct within the right cerebellar hemisphere, Chronic infarcts in the left parietal and occipital lobes.  No acute intracranial hemorrhage. patient was started on mag, kcl, phosph and iv fluid on admission, evaluated by icu and nephrology, concern for hypomagnesemia as per nephrology likely in setting of PPI which is now discontinued.  patient kept in medicine floor, feels great today, lytes improved, PT to evaluate patient for safe dc, baseline at home uses walker. patient  electrolytes all wnl, will discharge home today. patient denies any pain or discomfort, chest pain, sob, dizziness, n/v/d/c. tolerating po intake.     Multiple electrolyte imbalance / hypokalemia / hypomagnesemia / hypocalcemia  generalized weakness  poor appetite   hx CVA  depression / anxiety   HTN/HLD    discontinue PPI.    follow up with pcp within 1 week

## 2022-12-31 NOTE — DISCHARGE NOTE PROVIDER - ATTENDING DISCHARGE PHYSICAL EXAMINATION:
T(C): 36.2 (12-31-22 @ 13:34), Max: 37.7 (12-30-22 @ 13:43)  HR: 92 (12-31-22 @ 13:34) (79 - 92)  BP: 142/90 (12-31-22 @ 13:34) (124/70 - 142/90)  RR: 18 (12-31-22 @ 13:34) (16 - 18)  SpO2: 96% (12-31-22 @ 13:34) (96% - 97%)    CONSTITUTIONAL: Well groomed, no apparent distress  EYES: PERRLA and symmetric, EOMI, No conjunctival or scleral injection, non-icteric  ENMT: Oral mucosa with moist membranes. Normal dentition; no pharyngeal injection or exudates             NECK: Supple, symmetric and without tracheal deviation   RESP: No respiratory distress, no use of accessory muscles; CTA b/l, no WRR  CV: RRR, +S1S2, no MRG; no JVD; no peripheral edema  GI: Soft, NT, ND, no rebound, no guarding; no palpable masses; no hepatosplenomegaly; no hernia palpated  SKIN: No rashes or ulcers noted; no subcutaneous nodules or induration palpable  NEURO: walks with a walker( chronic back pain), CN II-XII intact; normal reflexes in upper and lower extremities, sensation intact in upper and lower extremities b/l to light touch   PSYCH: Appropriate insight/judgment; A+O x 3, mood and affect appropriate, recent/remote memory intact

## 2022-12-31 NOTE — PROGRESS NOTE ADULT - ASSESSMENT
a 72-year-old female, past medical history CVA (2017) anxiety, depression, hypertension, hyperlipidemia, presents emergency department for weakness and malaise for over two weeks. Pt reports she has not been eating and vomited yesterday, endorsed shes taken care of her sick daughter and has been in a lot of stress, patient was found to have multiple electrolytes abnormality, No radiographic evidence of acute cardiopulmonary disease, ct head showed Small age indeterminate infarct within the right cerebellar hemisphere, Chronic infarcts in the left parietal and occipital lobes.  No acute intracranial hemorrhage. patient was started on mag, kcl, phosph and iv fluid on admission, evaluated by icu and nephrology, concern for hypomag as per nephrology likely in setting of PPI which is now discontinued.  patient kept in medicine floor, feels great today, lytes improved, PT to evaluate patient for safe dc, baseline at home uses walker. will monitor until tomorrow then send home.    Multiple electrolyte imbalance / hypokalemia / hypomagnesemia / hypocalcemia  generalized weakness  poor appetite   hx CVA  depression / anxiety   HTN/HLD    Plan:  - replete lytes as needed mag >2, k >4, phosph >2.5   - Continue Iv fluids as needed  - Nephrology, and Neurology Rec's appr.   - PT/rehab, SLP,   - tylenol and pain management   - continue current medication, antihypertensive (with holding parameters),  - monitor vitals closely, daily am labs  - precaution (fall/aspiration/seizure)    DVT prophylaxis: lovenox   GI prophylaxis: Protonix   diet: HH diet   code status: full code  Goals of care/disposition: Home     #Progress Note Handoff  Pending (specify):  dc home tomorrow   Disposition: home       a 72-year-old female, past medical history CVA (2017) anxiety, depression, hypertension, hyperlipidemia, presents emergency department for weakness and malaise for over two weeks. Pt reports she has not been eating and vomited yesterday, endorsed shes taken care of her sick daughter and has been in a lot of stress, patient was found to have multiple electrolytes abnormality, No radiographic evidence of acute cardiopulmonary disease, ct head showed Small age indeterminate infarct within the right cerebellar hemisphere, Chronic infarcts in the left parietal and occipital lobes.  No acute intracranial hemorrhage. patient was started on mag, kcl, phosph and iv fluid on admission, evaluated by icu and nephrology, concern for hypomag as per nephrology likely in setting of PPI which is now discontinued.  patient kept in medicine floor, feels great today, lytes improved, PT to evaluate patient for safe dc, baseline at home uses walker. if lytes wnl will send home.    Multiple electrolyte imbalance / hypokalemia / hypomagnesemia / hypocalcemia  generalized weakness  poor appetite   hx CVA  depression / anxiety   HTN/HLD    Plan:  - replete lytes as needed mag >2, k >4, phosph >2.5   - Continue Iv fluids as needed  - Nephrology, and Neurology Rec's appr.   - PT/rehab, SLP,   - tylenol and pain management   - continue current medication, antihypertensive (with holding parameters),  - monitor vitals closely, daily am labs  - precaution (fall/aspiration/seizure)    DVT prophylaxis: lovenox   GI prophylaxis: Protonix   diet: HH diet   code status: full code  Goals of care/disposition: Home     #Progress Note Handoff  Pending (specify):  dc home today   Disposition: home

## 2022-12-31 NOTE — PROGRESS NOTE ADULT - ASSESSMENT
symptomatic (tetany) severe hypomagnesemia    - possibly due to PPI, aggravated by poor po intake, emesis  hypokalemia and hypocalcemia likely secondary to severe hypomagnesemia   anxiety / depression  HTN / hx of TIA  asymptomatic GERD  hx of vit D deficiency    plan:    keep off omeprazole  cont pepcid 20mg po qd  cont mag-ox 400mg po tid  cont vit D / calcium carbonate  f/u FeMg++ - ordered UMg++ and UCr yesterday  check iPTH, 25vit D, 1,25Vit D    full code  outpt f/u 1-2 weeks with BMP, Mg++ check

## 2022-12-31 NOTE — DISCHARGE NOTE PROVIDER - NSDCCPCAREPLAN_GEN_ALL_CORE_FT
PRINCIPAL DISCHARGE DIAGNOSIS  Diagnosis: Hypokalemia  Assessment and Plan of Treatment: Repleted:     , follow up repeat levels as outpatient      SECONDARY DISCHARGE DIAGNOSES  Diagnosis: Hypocalcemia  Assessment and Plan of Treatment: repleted:    follow up repeat levels as outpatient    Diagnosis: Hypomagnesemia  Assessment and Plan of Treatment: repleted:    follow up repeat levels as outpatient

## 2022-12-31 NOTE — DISCHARGE NOTE NURSING/CASE MANAGEMENT/SOCIAL WORK - PATIENT PORTAL LINK FT
You can access the FollowMyHealth Patient Portal offered by [facility] by registering at the following website: [email or web address]. By joining [employer]’s FollowMyHealth portal, you will also be able to view your health information using other applications (apps) compatible with our system.

## 2022-12-31 NOTE — DISCHARGE NOTE PROVIDER - CARE PROVIDER_API CALL
ALVIN PADILLA  Endocrinology, Diabetes and Metabolism  [address] [place] [address]  Phone: [phone]  Fax: [phone]  Follow Up Time: 2 weeks

## 2022-12-31 NOTE — PROGRESS NOTE ADULT - REASON FOR ADMISSION
Weakness and general malaise

## 2022-12-31 NOTE — PROGRESS NOTE ADULT - SUBJECTIVE AND OBJECTIVE BOX
JOHNNY LEMONS 72y Female  MRN#: [id number]   Hospital Day: 2d    SUBJECTIVE  Patient is a 72y old Female who presents with a chief complaint of Weakness and general malaise (30 Dec 2022 11:31)  Currently admitted to medicine with the primary diagnosis of Hypokalemia      INTERVAL HPI AND OVERNIGHT EVENTS:  Patient was examined and seen at bedside. This morning she is resting comfortably in bed and reports no issues or overnight events.  feels much better, feels a little weak otherwise better, denies chest pain , sob, n/v/d/c, hematuria or bloody stool.     REVIEW OF SYMPTOMS:  10 point ROS negative except as above.    OBJECTIVE  PAST MEDICAL & SURGICAL HISTORY  Benign hypertension without CHF    H/O anxiety disorder    Chronic GERD    After-cataract of both eyes    Transient ischemic attack (TIA)  3/2017    Cataract extraction status of eye, right      ALLERGIES:  No Known Allergies    MEDICATIONS:  STANDING MEDICATIONS  aspirin enteric coated 325 milliGRAM(s) Oral daily  atorvastatin 80 milliGRAM(s) Oral at bedtime  bisacodyl 10 milliGRAM(s) Oral daily  calcium carbonate 1250 mG  + Vitamin D (OsCal 500 + D) 1 Tablet(s) Oral every 8 hours  enoxaparin Injectable 40 milliGRAM(s) SubCutaneous every 24 hours  famotidine    Tablet 20 milliGRAM(s) Oral daily  magnesium oxide 400 milliGRAM(s) Oral three times a day with meals  magnesium sulfate  IVPB 1 Gram(s) IV Intermittent every 1 hour  polyethylene glycol 3350 17 Gram(s) Oral daily  potassium chloride   Powder 40 milliEquivalent(s) Oral every 12 hours    PRN MEDICATIONS  acetaminophen     Tablet .. 650 milliGRAM(s) Oral every 6 hours PRN  ALPRAZolam 0.25 milliGRAM(s) Oral three times a day PRN  melatonin 3 milliGRAM(s) Oral at bedtime PRN  ondansetron Injectable 4 milliGRAM(s) IV Push every 8 hours PRN      VITAL SIGNS: Last 24 Hours    Vital Signs Last 24 Hrs  T(C): 35.9 (31 Dec 2022 05:00), Max: 37.7 (30 Dec 2022 13:43)  T(F): 96.6 (31 Dec 2022 05:00), Max: 99.8 (30 Dec 2022 13:43)  HR: 79 (31 Dec 2022 05:00) (79 - 90)  BP: 126/73 (31 Dec 2022 05:00) (124/70 - 136/72)  BP(mean): --  RR: 18 (31 Dec 2022 05:00) (16 - 18)  SpO2: 97% (31 Dec 2022 05:00) (97% - 97%)    PHYSICAL EXAM:  GENERAL: NAD, well-groomed, well-developed  HEENT - NC/AT,   NECK: Supple, No JVD  CHEST/LUNG: Clear to auscultation bilaterally; No rales, rhonchi, wheezing  HEART: Regular rate and rhythm; No murmurs, rubs, or gallops  ABDOMEN: Soft, Nontender, Nondistended; Bowel sounds present  EXTREMITIES:  2+ Peripheral Pulses, No clubbing, cyanosis, or edema  NEURO:  No Focal deficits, sensory and motor intact  SKIN: No rashes or lesions    LABS:                         12.0   6.26  )-----------( 285      ( 30 Dec 2022 08:10 )             36.5     12-30    141  |  109  |  9<L>  ----------------------------<  94  4.6   |  21  |  <0.5<L>    Ca    7.7<L>      30 Dec 2022 08:10  Mg     1.4     12-30    TPro  6.1  /  Alb  3.2<L>  /  TBili  0.4  /  DBili  x   /  AST  30  /  ALT  21  /  AlkPhos  108  12-30      RADIOLOGY:    < from: Xray Chest 2 Views PA/Lat (12.12.22 @ 15:03) >    Impression:    No radiographic evidence of acute cardiopulmonary disease.      < end of copied text >  < from: CT Head No Cont (12.30.22 @ 09:31) >  IMPRESSION:    1.  Small age indeterminate infarct within the right cerebellar   hemisphere (new since 2017).    2.  Chronic infarcts in the left parietal and occipital lobes.    3.  No acute intracranial hemorrhage.    < end of copied text >

## 2022-12-31 NOTE — PROGRESS NOTE ADULT - SUBJECTIVE AND OBJECTIVE BOX
NEPHROLOGY FOLLOW UP NOTE    no overnight events  resting comfortably    PAST MEDICAL & SURGICAL HISTORY:  Benign hypertension without CHF      H/O anxiety disorder      Chronic GERD      After-cataract of both eyes      Transient ischemic attack (TIA)  3/2017      Cataract extraction status of eye, right        Allergies:  No Known Allergies    Home Medications Reviewed    SOCIAL HISTORY:  Denies ETOH,Smoking,   FAMILY HISTORY:        REVIEW OF SYSTEMS currently:  CONSTITUTIONAL: No weakness, fevers or chills  EYES/ENT: No visual changes;  No vertigo or throat pain   NECK: No pain or stiffness  RESPIRATORY: No cough, wheezing, hemoptysis; No shortness of breath  CARDIOVASCULAR: No chest pain or palpitations.  GASTROINTESTINAL: No abdominal or epigastric pain. No nausea, vomiting, or hematemesis; No diarrhea or constipation. No melena or hematochezia.  GENITOURINARY: No dysuria, frequency, foamy urine, urinary urgency, incontinence or hematuria  NEUROLOGICAL: No numbness or weakness  SKIN: No itching, burning, rashes, or lesions   VASCULAR: No bilateral lower extremity edema.   All other review of systems is negative unless indicated above.    PHYSICAL EXAM:  Constitutional: NAD  HEENT: anicteric sclera, oropharynx clear, MMM  Neck: No JVD  Respiratory: CTAB, no wheezes, rales or rhonchi  Cardiovascular: S1, S2, RRR  Gastrointestinal: BS+, soft, NT/ND  Extremities: No cyanosis or clubbing. No peripheral edema  Neurological: A/O x 3, no focal deficits  Psychiatric: Normal mood, normal affect  : No CVA tenderness. No leal.   Skin: No rashes    advance care planning and directives reviewed     Hospital Medications:   MEDICATIONS  (STANDING):  aspirin enteric coated 325 milliGRAM(s) Oral daily  atorvastatin 80 milliGRAM(s) Oral at bedtime  bisacodyl 10 milliGRAM(s) Oral daily  calcium carbonate 1250 mG  + Vitamin D (OsCal 500 + D) 1 Tablet(s) Oral every 8 hours  enoxaparin Injectable 40 milliGRAM(s) SubCutaneous every 24 hours  famotidine    Tablet 20 milliGRAM(s) Oral daily  magnesium oxide 400 milliGRAM(s) Oral three times a day with meals  polyethylene glycol 3350 17 Gram(s) Oral daily        VITALS:  T(F): 96.6 (22 @ 05:00), Max: 99.8 (22 @ 13:43)  HR: 79 (22 @ 05:00)  BP: 126/73 (22 @ 05:00)  RR: 18 (22 @ 05:00)  SpO2: 97% (22 @ 05:00)  Wt(kg): --     @ 07:01  -   @ 07:00  --------------------------------------------------------  IN: 0 mL / OUT: 350 mL / NET: -350 mL          LABS:      141  |  109  |  9<L>  ----------------------------<  94  4.6   |  21  |  <0.5<L>    Ca    7.7<L>      30 Dec 2022 08:10  Mg     1.4         TPro  6.1  /  Alb  3.2<L>  /  TBili  0.4  /  DBili      /  AST    /  ALT  21  /  AlkPhos  108                            12.0   6.26  )-----------( 285      ( 30 Dec 2022 08:10 )             36.5       Urine Studies:  Urinalysis Basic - ( 28 Dec 2022 20:50 )    Color: Yellow / Appearance: Clear / S.025 / pH:   Gluc:  / Ketone: 40  / Bili: Small / Urobili: 2.0 mg/dL   Blood:  / Protein: 30 mg/dL / Nitrite: Negative   Leuk Esterase: Negative / RBC: Negative / WBC 1-2 /HPF   Sq Epi:  / Non Sq Epi: Few /HPF / Bacteria: Few          RADIOLOGY & ADDITIONAL STUDIES:

## 2023-01-02 LAB
CALCIUM SERPL-MCNC: 8 MG/DL — LOW (ref 8.4–10.5)
PTH-INTACT FLD-MCNC: 144 PG/ML — HIGH (ref 15–65)

## 2023-01-04 DIAGNOSIS — F41.9 ANXIETY DISORDER, UNSPECIFIED: ICD-10-CM

## 2023-01-04 DIAGNOSIS — F32.A DEPRESSION, UNSPECIFIED: ICD-10-CM

## 2023-01-04 DIAGNOSIS — R53.1 WEAKNESS: ICD-10-CM

## 2023-01-04 DIAGNOSIS — E83.51 HYPOCALCEMIA: ICD-10-CM

## 2023-01-04 DIAGNOSIS — Z98.41 CATARACT EXTRACTION STATUS, RIGHT EYE: ICD-10-CM

## 2023-01-04 DIAGNOSIS — E83.42 HYPOMAGNESEMIA: ICD-10-CM

## 2023-01-04 DIAGNOSIS — I10 ESSENTIAL (PRIMARY) HYPERTENSION: ICD-10-CM

## 2023-01-04 DIAGNOSIS — G89.29 OTHER CHRONIC PAIN: ICD-10-CM

## 2023-01-04 DIAGNOSIS — M54.50 LOW BACK PAIN, UNSPECIFIED: ICD-10-CM

## 2023-01-04 DIAGNOSIS — D72.829 ELEVATED WHITE BLOOD CELL COUNT, UNSPECIFIED: ICD-10-CM

## 2023-01-04 DIAGNOSIS — K21.9 GASTRO-ESOPHAGEAL REFLUX DISEASE WITHOUT ESOPHAGITIS: ICD-10-CM

## 2023-01-04 DIAGNOSIS — E78.5 HYPERLIPIDEMIA, UNSPECIFIED: ICD-10-CM

## 2023-01-04 DIAGNOSIS — Z86.73 PERSONAL HISTORY OF TRANSIENT ISCHEMIC ATTACK (TIA), AND CEREBRAL INFARCTION WITHOUT RESIDUAL DEFICITS: ICD-10-CM

## 2023-01-04 DIAGNOSIS — Z79.82 LONG TERM (CURRENT) USE OF ASPIRIN: ICD-10-CM

## 2023-01-04 DIAGNOSIS — E87.6 HYPOKALEMIA: ICD-10-CM

## 2023-01-13 NOTE — ASU PATIENT PROFILE, ADULT - FALL HARM RISK CONCLUSION
Head is normocephalic and atraumatic. No head tenderness or skull depression on palpation. Pt is alert and oriented x 3, able to follow commands. No facial asymmetry. Pupils 5 mm equally round with PERRL, no nystagmus, EOM intact. Cranial nerves III-XII grossly intact. Coordination nose-to-finger intact. All limbs equally strong bilaterally 5/5. No pronator drift.  No spinal/paraspinal tenderness. Neck is non-tender, supple with full range of motion. No nuchal rigidity. Universal Safety Interventions

## 2023-12-02 ENCOUNTER — EMERGENCY (EMERGENCY)
Facility: HOSPITAL | Age: 73
LOS: 0 days | Discharge: HOME | End: 2023-12-02
Attending: EMERGENCY MEDICINE
Payer: MEDICARE

## 2023-12-02 VITALS
SYSTOLIC BLOOD PRESSURE: 148 MMHG | HEART RATE: 74 BPM | DIASTOLIC BLOOD PRESSURE: 70 MMHG | RESPIRATION RATE: 18 BRPM | TEMPERATURE: 98 F | OXYGEN SATURATION: 100 %

## 2023-12-02 VITALS — WEIGHT: 160.06 LBS | HEIGHT: 64 IN
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
## 2023-12-02 DIAGNOSIS — Z79.82 LONG TERM (CURRENT) USE OF ASPIRIN: ICD-10-CM

## 2023-12-02 DIAGNOSIS — Z76.0 ENCOUNTER FOR ISSUE OF REPEAT PRESCRIPTION: ICD-10-CM

## 2023-12-02 DIAGNOSIS — M25.521 PAIN IN RIGHT ELBOW: ICD-10-CM

## 2023-12-02 DIAGNOSIS — M25.561 PAIN IN RIGHT KNEE: ICD-10-CM
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
## 2023-12-02 DIAGNOSIS — Z98.41 CATARACT EXTRACTION STATUS, RIGHT EYE: Chronic | ICD-10-CM

## 2023-12-02 DIAGNOSIS — I10 ESSENTIAL (PRIMARY) HYPERTENSION: ICD-10-CM

## 2023-12-02 DIAGNOSIS — Z86.73 PERSONAL HISTORY OF TRANSIENT ISCHEMIC ATTACK (TIA), AND CEREBRAL INFARCTION WITHOUT RESIDUAL DEFICITS: ICD-10-CM

## 2023-12-02 PROCEDURE — 73110 X-RAY EXAM OF WRIST: CPT | Mod: 26,LT

## 2023-12-02 PROCEDURE — 73110 X-RAY EXAM OF WRIST: CPT | Mod: LT

## 2023-12-02 PROCEDURE — 73070 X-RAY EXAM OF ELBOW: CPT | Mod: RT

## 2023-12-02 PROCEDURE — 73564 X-RAY EXAM KNEE 4 OR MORE: CPT | Mod: 26,50

## 2023-12-02 PROCEDURE — 99284 EMERGENCY DEPT VISIT MOD MDM: CPT

## 2023-12-02 PROCEDURE — 73564 X-RAY EXAM KNEE 4 OR MORE: CPT | Mod: 50

## 2023-12-02 PROCEDURE — 99284 EMERGENCY DEPT VISIT MOD MDM: CPT | Mod: 25

## 2023-12-02 PROCEDURE — 73070 X-RAY EXAM OF ELBOW: CPT | Mod: 26,RT

## 2023-12-02 RX ORDER — ACETAMINOPHEN 500 MG
650 TABLET ORAL ONCE
Refills: 0 | Status: COMPLETED | OUTPATIENT
Start: 2023-12-02 | End: 2023-12-02

## 2023-12-02 RX ORDER — IBUPROFEN 200 MG
600 TABLET ORAL ONCE
Refills: 0 | Status: COMPLETED | OUTPATIENT
Start: 2023-12-02 | End: 2023-12-02

## 2023-12-02 RX ADMIN — Medication 650 MILLIGRAM(S): at 20:52

## 2023-12-02 RX ADMIN — Medication 600 MILLIGRAM(S): at 20:52

## 2023-12-02 NOTE — ED PROVIDER NOTE - OBJECTIVE STATEMENT
Patient is 73-year-old female past medical history of hypertension and prior CVA in 2017 on aspirin presenting to ED for evaluation mechanical fall.  The hospital 11:30 AM today.  Patient states she was walking when she had her foot caught on the floor fell landing on her knees and landing on her right elbow.  Denies any head trauma or LOC or other anticoagulation use.  Primary complaint is of right knee pain.  Also complains of mild left left knee pain and right elbow pain. Otherwise denies any fever, chills, headache, changes in vision, cough, congestion, cp, palpitations, sob, n/v/d, abd pain, constipation, urinary complaints.

## 2023-12-02 NOTE — ED ADULT NURSE NOTE - NSWEIGHTCALCTOOLDRUG_GEN_A_CORE
From: Brianna Clement  To: Areli Ramirez MD  Sent: 7/1/2017 4:15 PM CDT  Subject: Immunizations for [place]    I know we discussed this, but for my upcoming trip to [place], I want to be sure I'm current on vaccinations against Polio, Typhoid, & Hepatitus C. I don't see them listed in my immunizations online, and I thought you said I was current. I just want to be sure.    Thanks.  Brianna Clement    used

## 2023-12-02 NOTE — ED PROVIDER NOTE - ATTENDING CONTRIBUTION TO CARE
74 yo f with pmh of htn and cva, presents with c/o b/l knee pain R>L, and R elbow pain s/p fall.  pt says fell this morning at 11am while visiting her daughter in the hospital.  pt says she tripped.  denies head trauma.  pt says she is ambulatory and only mild pain, but wanted to be checked out.  pt denies headache, neck pain, cp, sob, abd pain, n/v/d.  exam: nad, ncat, perrl, eomi, mmm, rrr, ctab, abd soft, nt, nd aox3, b/l knee with small ecchymosis, no significant swelling, FROM, R elbow minimal ttp on exam and FROM, pt normal ambulation.  imp[facility] 72 yo f with pmh of htn and cva, presents with c/o b/l knee pain R>L, and R elbow pain s/p fall.  pt says fell this morning at 11am while visiting her daughter in the hospital.  pt says she tripped.  denies head trauma.  pt says she is ambulatory and only mild pain, but wanted to be checked out.  pt denies headache, neck pain, cp, sob, abd pain, n/v/d.  exam: nad, ncat, perrl, eomi, mmm, rrr, ctab, abd soft, nt, nd aox3, b/l knee with small ecchymosis, no significant swelling, FROM, R elbow minimal ttp on exam and FROM, pt normal ambulation.  imp[facility] 72 yo f with pmh of htn and cva, presents with c/o b/l knee pain R>L, and R elbow pain s/p fall.  pt says fell this morning at 11am while visiting her daughter in the hospital.  pt says she tripped.  denies head trauma.  pt says she is ambulatory and only mild pain, but wanted to be checked out.  pt denies headache, neck pain, cp, sob, abd pain, n/v/d.  exam: nad, ncat, perrl, eomi, mmm, rrr, ctab, abd soft, nt, nd aox3, b/l knee with small ecchymosis, no significant swelling, FROM, R elbow minimal ttp on exam and FROM, pt normal ambulation.  [facility]

## 2023-12-02 NOTE — ED ADULT NURSE NOTE - OBJECTIVE STATEMENT
Patient reports she was walking to the cafeteria and fell in the hallway of this hospital, c/o bilat knee pain, right elbow pain and left wrist pain, denies LOC, denies hitting her head.

## 2023-12-02 NOTE — ED PROVIDER NOTE - CLINICAL SUMMARY MEDICAL DECISION MAKING FREE TEXT BOX
pt with mechanical trip and fall, contusions on exam, neg acute traumatic pathology on xrays.  Pt was given strict return to ED precautions and pt indicated that he/she understood.

## 2023-12-02 NOTE — ED PROVIDER NOTE - PROGRESS NOTE DETAILS
pk: will obtain xrays, give pain meds pk: all results d/w pt & copies given, strict return precautions discussed, rec outpt f.u with pcp

## 2023-12-02 NOTE — ED PROVIDER NOTE - PATIENT PORTAL LINK FT
You can access the FollowMyHealth Patient Portal offered by [facility] by registering at the following website: [email or web address]. By joining [employer]’s FollowMyHealth portal, you will also be able to view your health information using other applications (apps) compatible with our system. You can access the FollowMyHealth Patient Portal offered by [facility] by registering at the following website: [email or web address]. By joining [employer]’s FollowMyHealth portal, you will also be able to view your health information using other applications (apps) compatible with our system. You can access the FollowMyHealth Patient Portal offered by [facility] by registering at the following website: [email or web address]. By joining [employer]’s FollowMyHealth portal, you will also be able to view your health information using other applications (apps) compatible with our system.

## 2023-12-02 NOTE — ED PROVIDER NOTE - PHYSICAL EXAMINATION
CONSTITUTIONAL: well-appearing, in NAD  SKIN: Warm dry, normal skin turgor  HEAD: NCAT  EYES: EOMI, PERRLA, no scleral icterus, conjunctiva pink  ENT: normal pharynx with no erythema or exudates  NECK: Supple; non tender. Full ROM.  CARD: RRR, no murmurs.  RESP: clear to ausculation b/l. No crackles or wheezing.  ABD: soft, non-tender, non-distended, no rebound or guarding.  EXT: Full ROM, bilateral knee TTP, right posterior elbow TTP, no pedal edema, no calf tenderness  NEURO: normal motor. normal sensory. CN II-XII intact. Cerebellar testing normal. Normal gait.  PSYCH: Cooperative, appropriate.

## 2023-12-03 ENCOUNTER — EMERGENCY (EMERGENCY)
Facility: HOSPITAL | Age: 73
LOS: 0 days | Discharge: HOME | End: 2023-12-03
Attending: STUDENT IN AN ORGANIZED HEALTH CARE EDUCATION/TRAINING PROGRAM
Payer: MEDICARE

## 2023-12-03 VITALS
RESPIRATION RATE: 18 BRPM | DIASTOLIC BLOOD PRESSURE: 85 MMHG | HEIGHT: 64 IN | HEART RATE: 78 BPM | SYSTOLIC BLOOD PRESSURE: 174 MMHG | OXYGEN SATURATION: 99 % | TEMPERATURE: 98 F

## 2023-12-03 DIAGNOSIS — Z79.82 LONG TERM (CURRENT) USE OF ASPIRIN: ICD-10-CM

## 2023-12-03 DIAGNOSIS — M25.521 PAIN IN RIGHT ELBOW: ICD-10-CM

## 2023-12-03 DIAGNOSIS — Z86.73 PERSONAL HISTORY OF TRANSIENT ISCHEMIC ATTACK (TIA), AND CEREBRAL INFARCTION WITHOUT RESIDUAL DEFICITS: ICD-10-CM

## 2023-12-03 DIAGNOSIS — M25.562 PAIN IN LEFT KNEE: ICD-10-CM
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
## 2023-12-03 DIAGNOSIS — M25.561 PAIN IN RIGHT KNEE: ICD-10-CM

## 2023-12-03 DIAGNOSIS — I10 ESSENTIAL (PRIMARY) HYPERTENSION: ICD-10-CM

## 2023-12-03 DIAGNOSIS — S42.401A UNSPECIFIED FRACTURE OF LOWER END OF RIGHT HUMERUS, INITIAL ENCOUNTER FOR CLOSED FRACTURE: ICD-10-CM

## 2023-12-03 DIAGNOSIS — Z98.41 CATARACT EXTRACTION STATUS, RIGHT EYE: Chronic | ICD-10-CM
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
## 2023-12-03 PROCEDURE — 99212 OFFICE O/P EST SF 10 MIN: CPT

## 2023-12-03 PROCEDURE — 99284 EMERGENCY DEPT VISIT MOD MDM: CPT | Mod: 57

## 2023-12-03 PROCEDURE — 24650 CLTX RDL HEAD/NCK FX WO MNPJ: CPT | Mod: 54,RT

## 2023-12-03 PROCEDURE — 29125 APPL SHORT ARM SPLINT STATIC: CPT | Mod: RT

## 2023-12-03 NOTE — ED PROVIDER NOTE - PROVIDER TOKENS
PROVIDER:[TOKEN:[[id number]],FOLLOWUP:[4-6 Days]] PROVIDER:[TOKEN:[[id number]],FOLLOWUP:[4-6 Days]] PROVIDER:[TOKEN:[[id number]],FOLLOWUP:[4-6 Days]]

## 2023-12-03 NOTE — ED PROVIDER NOTE - PATIENT PORTAL LINK FT
You can access the FollowMyHealth Patient Portal offered by [facility] by registering at the following website: [email or web address]. By joining "[employer]"’s FollowMyHealth portal, you will also be able to view your health information using other applications (apps) compatible with our system. You can access the FollowMyHealth Patient Portal offered by [facility] by registering at the following website: [email or web address]. By joining [employer]’s FollowMyHealth portal, you will also be able to view your health information using other applications (apps) compatible with our system. You can access the FollowMyHealth Patient Portal offered by [facility] by registering at the following website: [email or web address]. By joining [employer]’s FollowMyHealth portal, you will also be able to view your health information using other applications (apps) compatible with our system.

## 2023-12-03 NOTE — ED PROVIDER NOTE - NSFOLLOWUPINSTRUCTIONS_ED_ALL_ED_FT
Our Emergency Department Referral Coordinators will be reaching out to you in the next 24-48 hours from 9:00am to 5:00pm with a follow up appointment. Please expect a phone call from the hospital in that time frame. If you do not receive a call or if you have any questions or concerns, you can reach them at   [phone].      Radial Head Fracture  Bones and nerves of the arm, with a close-up showing a type 3 fracture in the radial head.   A radial head fracture is a break in one of the bones of the forearm (radius), near the elbow joint. There are two bones in the forearm. The radius, or radial bone, is the bone on the side of the thumb.    There are different types of radial head fractures. The type is determined by the amount of movement (displacement) of bones from their normal positions:  Type 1. This is a small fracture in which the bone pieces remain together (nondisplaced fracture).  Type 2. The fracture is moderate, and bone pieces are slightly displaced.  Type 3. There are multiple fractures and displaced bone pieces.  What are the causes?  This condition is usually caused by falling and landing on an outstretched arm.    What increases the risk?  You are more likely to develop this condition if you:  Are an older female.  Participate in sports that are more likely to cause falls while running or jumping.  Have weak bones (osteoporosis).  What are the signs or symptoms?  Symptoms of this condition include:  Swelling of the elbow joint.  Pain and difficulty when moving the elbow joint, such as when straightening the elbow or rotating the forearm.  How is this diagnosed?  This condition is diagnosed based on your medical history and a physical exam. You may have X-rays to confirm the type of fracture.    How is this treated?  Treatment for this condition includes resting, icing, and raising (elevating) the injured area above the level of your heart. You may be given medicines to help relieve pain.    Treatment varies depending on the type of fracture you have.  For a type 1 fracture, you may be given a splint or sling to keep your arm and elbow from moving for several days.  For a type 2 fracture, you may be given a splint or sling to keep your arm and elbow from moving for several days. If the displacement is more severe, you may need surgery.  For a type 3 fracture, you will usually need surgery to have bone pieces removed. The entire radial head may need to be removed if the damage is severe. The fracture will be stabilized with a splint and sling.  Follow these instructions at home:  Medicines    Take over-the-counter and prescription medicines only as told by your health care provider.  Ask your health care provider if the medicine prescribed to you:  Requires you to avoid driving or using machinery.  Can cause constipation. You may need to take these actions to prevent or treat constipation:  Drink enough fluid to keep your urine pale yellow.  Take over-the-counter or prescription medicines.  Eat foods that are high in fiber, such as beans, whole grains, and fresh fruits and vegetables.  Limit foods that are high in fat and processed sugars, such as fried or sweet foods.  If you have a removable splint or sling:    Wear the splint or sling as told by your health care provider. Remove it only as told by your health care provider.  Check the skin around the splint or sling every day. Tell your health care provider about any concerns.  Loosen the splint or sling if your fingers tingle, become numb, or turn cold and blue.  Keep it clean and dry.  If you have a nonremovable splint:    Do not put pressure on any part of the splint until it is fully hardened. This may take several hours.  Do not stick anything inside the splint to scratch your skin. Doing that increases your risk of infection.  Check the skin around the splint every day. Tell your health care provider about any concerns.  You may put lotion on dry skin around the edges of the splint. Do not put lotion on the skin underneath the splint.  Keep it clean and dry.  Bathing    Do not take baths, swim, or use a hot tub until your health care provider approves. Ask your health care provider if you may take showers. You may only be allowed to take sponge baths.  If the splint or sling is not waterproof:  Do not let it get wet.  Cover it with a watertight covering when you take a bath or shower.  Managing pain, stiffness, and swelling    Bag of ice on a towel on the skin.  If directed, put ice on the injured area. To do this:  If you have a removable splint or sling, remove it as told by your health care provider.  Put ice in a plastic bag.  Place a towel between your skin and the bag or between your splint and the bag.  Leave the ice on for 20 minutes, 2–3 times a day.  Remove the ice if your skin turns bright red. This is very important. If you cannot feel pain, heat, or cold, you have a greater risk of damage to the area.  Move your fingers often to reduce stiffness and swelling.  Raise (elevate) the injured area above the level of your heart while you are sitting or lying down.  Activity    Ask your health care provider when it is safe to drive if you have a splint or sling on your arm.  Do not lift anything that is heavier than 10 lb (4.5 kg), or the limit that you are told, until your health care provider says that it is safe.  Return to your normal activities as told by your health care provider. Ask your health care provider what activities are safe for you.  Do exercises as told by your health care provider or physical therapist.  General instructions    Do not use any products that contain nicotine or tobacco. These products include cigarettes, chewing tobacco, and vaping devices, such as e-cigarettes. If you need help quitting, ask your health care provider.  Keep all follow-up visits. This is important.  Contact a health care provider if:  You have problems with your splint.  You have pain or swelling that gets worse.  Get help right away if:  You have severe pain, especially if the pain changes significantly or suddenly.  You have fluid or a bad smell coming from your splint.  Your hand or fingers get cold or turn pale or blue.  You lose feeling in any part of your hand or arm.  Summary  A radial head fracture is a break in one of the bones in your forearm (radius), near the elbow joint.  This condition is usually caused by falling and landing on an outstretched arm.  This condition may be treated with rest, ice, elevation, pain medicines, a splint or sling, and surgery. Treatment will vary depending on the type of fracture you have. Our Emergency Department Referral Coordinators will be reaching out to you in the next 24-48 hours from 9:00am to 5:00pm with a follow up appointment. Please expect a phone call from the hospital in that time frame. If you do not receive a call or if you have any questions or concerns, you can reach them at   [phone].      Radial Head Fracture  Bones and nerves of the arm, with a close-up showing a type 3 fracture in the radial head.   A radial head fracture is a break in one of the bones of the forearm (radius), near the elbow joint. There are two bones in the forearm. The radius, or radial bone, is the bone on the side of the thumb.    There are different types of radial head fractures. The type is determined by the amount of movement (displacement) of bones from their normal positions:  Type 1. This is a small fracture in which the bone pieces remain together (nondisplaced fracture).  Type 2. The fracture is moderate, and bone pieces are slightly displaced.  Type 3. There are multiple fractures and displaced bone pieces.  What are the causes?  This condition is usually caused by falling and landing on an outstretched arm.    What increases the risk?  You are more likely to develop this condition if you:  Are an older female.  Participate in sports that are more likely to cause falls while running or jumping.  Have weak bones (osteoporosis).  What are the signs or symptoms?  Symptoms of this condition include:  Swelling of the elbow joint.  Pain and difficulty when moving the elbow joint, such as when straightening the elbow or rotating the forearm.  How is this diagnosed?  This condition is diagnosed based on your medical history and a physical exam. You may have X-rays to confirm the type of fracture.    How is this treated?  Treatment for this condition includes resting, icing, and raising (elevating) the injured area above the level of your heart. You may be given medicines to help relieve pain.    Treatment varies depending on the type of fracture you have.  For a type 1 fracture, you may be given a splint or sling to keep your arm and elbow from moving for several days.  For a type 2 fracture, you may be given a splint or sling to keep your arm and elbow from moving for several days. If the displacement is more severe, you may need surgery.  For a type 3 fracture, you will usually need surgery to have bone pieces removed. The entire radial head may need to be removed if the damage is severe. The fracture will be stabilized with a splint and sling.  Follow these instructions at home:  Medicines    Take over-the-counter and prescription medicines only as told by your health care provider.  Ask your health care provider if the medicine prescribed to you:  Requires you to avoid driving or using machinery.  Can cause constipation. You may need to take these actions to prevent or treat constipation:  Drink enough fluid to keep your urine pale yellow.  Take over-the-counter or prescription medicines.  Eat foods that are high in fiber, such as beans, whole grains, and fresh fruits and vegetables.  Limit foods that are high in fat and processed sugars, such as fried or sweet foods.  If you have a removable splint or sling:    Wear the splint or sling as told by your health care provider. Remove it only as told by your health care provider.  Check the skin around the splint or sling every day. Tell your health care provider about any concerns.  Loosen the splint or sling if your fingers tingle, become numb, or turn cold and blue.  Keep it clean and dry.  If you have a nonremovable splint:    Do not put pressure on any part of the splint until it is fully hardened. This may take several hours.  Do not stick anything inside the splint to scratch your skin. Doing that increases your risk of infection.  Check the skin around the splint every day. Tell your health care provider about any concerns.  You may put lotion on dry skin around the edges of the splint. Do not put lotion on the skin underneath the splint.  Keep it clean and dry.  Bathing    Do not take baths, swim, or use a hot tub until your health care provider approves. Ask your health care provider if you may take showers. You may only be allowed to take sponge baths.  If the splint or sling is not waterproof:  Do not let it get wet.  Cover it with a watertight covering when you take a bath or shower.  Managing pain, stiffness, and swelling    Bag of ice on a towel on the skin.  If directed, put ice on the injured area. To do this:  If you have a removable splint or sling, remove it as told by your health care provider.  Put ice in a plastic bag.  Place a towel between your skin and the bag or between your splint and the bag.  Leave the ice on for 20 minutes, 2–3 times a day.  Remove the ice if your skin turns bright red. This is very important. If you cannot feel pain, heat, or cold, you have a greater risk of damage to the area.  Move your fingers often to reduce stiffness and swelling.  Raise (elevate) the injured area above the level of your heart while you are sitting or lying down.  Activity    Ask your health care provider when it is safe to drive if you have a splint or sling on your arm.  Do not lift anything that is heavier than 10 lb (4.5 kg), or the limit that you are told, until your health care provider says that it is safe.  Return to your normal activities as told by your health care provider. Ask your health care provider what activities are safe for you.  Do exercises as told by your health care provider or physical therapist.  General instructions    Do not use any products that contain nicotine or tobacco. These products include cigarettes, chewing tobacco, and vaping devices, such as e-cigarettes. If you need help quitting, ask your health care provider.  Keep all follow-up visits. This is important.  Contact a health care provider if:  You have problems with your splint.  You have pain or swelling that gets worse.  Get help right away if:  You have severe pain, especially if the pain changes significantly or suddenly.  You have fluid or a bad smell coming from your splint.  Your hand or fingers get cold or turn pale or blue.  You lose feeling in any part of your hand or arm.  Summary  A radial head fracture is a break in one of the bones in your forearm (radius), near the elbow joint.  This condition is usually caused by falling and landing on an outstretched arm.  This condition may be treated with rest, ice, elevation, pain medicines, a splint or sling, and surgery. Treatment will vary depending on the type of fracture you have. Our Emergency Department Referral Coordinators will be reaching out to you in the next 24-48 hours from 9:00am to 5:00pm with a follow up appointment. Please expect a phone call from the hospital in that time frame. If you do not receive a call or if you have any questions or concerns, you can reach them at   [phone].      Radial Head Fracture  Bones and nerves of the arm, with a close-up showing a type 3 fracture in the radial head.   A radial head fracture is a break in one of the bones of the forearm (radius), near the elbow joint. There are two bones in the forearm. The radius, or radial bone, is the bone on the side of the thumb.    There are different types of radial head fractures. The type is determined by the amount of movement (displacement) of bones from their normal positions:  Type 1. This is a small fracture in which the bone pieces remain together (nondisplaced fracture).  Type 2. The fracture is moderate, and bone pieces are slightly displaced.  Type 3. There are multiple fractures and displaced bone pieces.  What are the causes?  This condition is usually caused by falling and landing on an outstretched arm.    What increases the risk?  You are more likely to develop this condition if you:  Are an older female.  Participate in sports that are more likely to cause falls while running or jumping.  Have weak bones (osteoporosis).  What are the signs or symptoms?  Symptoms of this condition include:  Swelling of the elbow joint.  Pain and difficulty when moving the elbow joint, such as when straightening the elbow or rotating the forearm.  How is this diagnosed?  This condition is diagnosed based on your medical history and a physical exam. You may have X-rays to confirm the type of fracture.    How is this treated?  Treatment for this condition includes resting, icing, and raising (elevating) the injured area above the level of your heart. You may be given medicines to help relieve pain.    Treatment varies depending on the type of fracture you have.  For a type 1 fracture, you may be given a splint or sling to keep your arm and elbow from moving for several days.  For a type 2 fracture, you may be given a splint or sling to keep your arm and elbow from moving for several days. If the displacement is more severe, you may need surgery.  For a type 3 fracture, you will usually need surgery to have bone pieces removed. The entire radial head may need to be removed if the damage is severe. The fracture will be stabilized with a splint and sling.  Follow these instructions at home:  Medicines    Take over-the-counter and prescription medicines only as told by your health care provider.  Ask your health care provider if the medicine prescribed to you:  Requires you to avoid driving or using machinery.  Can cause constipation. You may need to take these actions to prevent or treat constipation:  Drink enough fluid to keep your urine pale yellow.  Take over-the-counter or prescription medicines.  Eat foods that are high in fiber, such as beans, whole grains, and fresh fruits and vegetables.  Limit foods that are high in fat and processed sugars, such as fried or sweet foods.  If you have a removable splint or sling:    Wear the splint or sling as told by your health care provider. Remove it only as told by your health care provider.  Check the skin around the splint or sling every day. Tell your health care provider about any concerns.  Loosen the splint or sling if your fingers tingle, become numb, or turn cold and blue.  Keep it clean and dry.  If you have a nonremovable splint:    Do not put pressure on any part of the splint until it is fully hardened. This may take several hours.  Do not stick anything inside the splint to scratch your skin. Doing that increases your risk of infection.  Check the skin around the splint every day. Tell your health care provider about any concerns.  You may put lotion on dry skin around the edges of the splint. Do not put lotion on the skin underneath the splint.  Keep it clean and dry.  Bathing    Do not take baths, swim, or use a hot tub until your health care provider approves. Ask your health care provider if you may take showers. You may only be allowed to take sponge baths.  If the splint or sling is not waterproof:  Do not let it get wet.  Cover it with a watertight covering when you take a bath or shower.  Managing pain, stiffness, and swelling    Bag of ice on a towel on the skin.  If directed, put ice on the injured area. To do this:  If you have a removable splint or sling, remove it as told by your health care provider.  Put ice in a plastic bag.  Place a towel between your skin and the bag or between your splint and the bag.  Leave the ice on for 20 minutes, 2–3 times a day.  Remove the ice if your skin turns bright red. This is very important. If you cannot feel pain, heat, or cold, you have a greater risk of damage to the area.  Move your fingers often to reduce stiffness and swelling.  Raise (elevate) the injured area above the level of your heart while you are sitting or lying down.  Activity    Ask your health care provider when it is safe to drive if you have a splint or sling on your arm.  Do not lift anything that is heavier than 10 lb (4.5 kg), or the limit that you are told, until your health care provider says that it is safe.  Return to your normal activities as told by your health care provider. Ask your health care provider what activities are safe for you.  Do exercises as told by your health care provider or physical therapist.  General instructions    Do not use any products that contain nicotine or tobacco. These products include cigarettes, chewing tobacco, and vaping devices, such as e-cigarettes. If you need help quitting, ask your health care provider.  Keep all follow-up visits. This is important.  Contact a health care provider if:  You have problems with your splint.  You have pain or swelling that gets worse.  Get help right away if:  You have severe pain, especially if the pain changes significantly or suddenly.  You have fluid or a bad smell coming from your splint.  Your hand or fingers get cold or turn pale or blue.  You lose feeling in any part of your hand or arm.  Summary  A radial head fracture is a break in one of the bones in your forearm (radius), near the elbow joint.  This condition is usually caused by falling and landing on an outstretched arm.  This condition may be treated with rest, ice, elevation, pain medicines, a splint or sling, and surgery. Treatment will vary depending on the type of fracture you have.

## 2023-12-03 NOTE — ED PROVIDER NOTE - PHYSICAL EXAMINATION
Physical Exam    Vital Signs: I have reviewed the initial vital signs.  Constitutional: appears stated age, no acute distress  Eyes: Sclera clear, EOMI.  Cardiovascular: S1 and S2, regular rate, regular rhythm, well-perfused extremities, radial pulses equal and 2+  Respiratory: unlabored respiratory effort, clear to auscultation bilaterally  Musculoskeletal: supple neck, mild tenderness to palpation to right proximal radius, full range of motion and 5/5 strength right elbow  Integumentary: warm, dry, ecchymosis to right elbow  Neurologic: awake, alert, oriented x3, extremities’ motor and sensory functions grossly intact

## 2023-12-03 NOTE — ED ADULT TRIAGE NOTE - CHIEF COMPLAINT QUOTE
pt fell in hospital yesterday while visiting her daughter, had xray of knees and right elbow, received call back to return to ED for CT of arm

## 2023-12-03 NOTE — ED PROVIDER NOTE - OBJECTIVE STATEMENT
73-year-old female with past medical history HTN, CVA 2017 on ASA presents called back to ED for right elbow x-ray demonstrating fracture.  Patient had mechanical fall yesterday while visiting her daughter in the hospital with subsequent bilateral knee and right elbow pain.  Right elbow x-ray significant for possible fracture of proximal radius.  Patient denies other complaints/fall/trauma.

## 2023-12-03 NOTE — ED PROCEDURE NOTE - CPROC ED TIME OUT STATEMENT1
“Patient's name, , procedure and correct site were confirmed during the [place] Timeout.” “Patient's name, , procedure and correct site were confirmed during the [place] Timeout.” “Patient's name, , procedure and correct site were confirmed during the [place] Timeout.”

## 2023-12-03 NOTE — ED PROVIDER NOTE - CARE PROVIDER_API CALL
Carlos Harry  Orthopaedic Surgery  [address] [place] [address]  Phone: [phone]  Fax: [phone]  Follow Up Time: 4-6 Days   Carlos Harry  Orthopaedic Surgery  [address] [place] [address]  Phone: [phone]  Fax: [phone]  Follow Up Time: 4-6 Days   Carlos Harry  Orthopaedic Surgery  [address] [place] [address]  Phone: [phone]  Fax: [phone]  Follow Up Time: 4-6 Days

## 2023-12-03 NOTE — ED PROVIDER NOTE - TEST CONSIDERED BUT NOT PERFORMED
Tests Considered But Not Performed CT considered. Discussed risk and benefits. Patient comfortable with outpatient follow up with orthopedics without CT at this time.

## 2023-12-03 NOTE — ED PROVIDER NOTE - CLINICAL SUMMARY MEDICAL DECISION MAKING FREE TEXT BOX
73-year-old female, past medical history of hypertension and prior CVA in 2017 on aspirin, presenting for right elbow fracture.  She was walking and slipped landing on her knees and right elbow yesterday.  Imaging was ordered right proximal radial head fracture noted.  Patient called back.  All imaging reviewed.  Radial plus palpable 2+.  No scaphoid tenderness.  Good range of motion with pain elicited on extension at right elbow.  FROM at right wrist and hand and digits. Mild swelling noted at left wrist.  Discussed all imaging results with patient.  Placed in sling.  Discussed risks and benefits of CT scan and patient agreeable to outpatient follow-up without CT scan.  Given strict return precautions and follow-up outpatient with orthopedics.  Patient agreeable to plan.

## 2023-12-04 ENCOUNTER — APPOINTMENT (OUTPATIENT)
Dept: ORTHOPEDIC SURGERY | Facility: CLINIC | Age: 73
End: 2023-12-04
Payer: MEDICARE

## 2023-12-04 VITALS — WEIGHT: 160 LBS | HEIGHT: 64 IN | BODY MASS INDEX: 27.31 KG/M2

## 2023-12-04 PROCEDURE — 99203 OFFICE O/P NEW LOW 30 MIN: CPT

## 2023-12-20 ENCOUNTER — APPOINTMENT (OUTPATIENT)
Dept: ORTHOPEDIC SURGERY | Facility: CLINIC | Age: 73
End: 2023-12-20
Payer: MEDICARE

## 2023-12-20 VITALS — BODY MASS INDEX: 27.31 KG/M2 | HEIGHT: 64 IN | WEIGHT: 160 LBS

## 2023-12-20 DIAGNOSIS — S52.124A NONDISPLACED FRACTURE OF HEAD OF RIGHT RADIUS, INITIAL ENCOUNTER FOR CLOSED FRACTURE: ICD-10-CM

## 2023-12-20 PROCEDURE — 99213 OFFICE O/P EST LOW 20 MIN: CPT

## 2023-12-20 PROCEDURE — 73080 X-RAY EXAM OF ELBOW: CPT | Mod: RT

## 2023-12-20 NOTE — PHYSICAL EXAM
[de-identified] : Physical exam of the right elbow: Resolving swelling.  Negative ecchymosis.  Nontender over the lateral or medial condyle.  Negative olecranon tenderness.  Mild pain over the radial head.  Pain with supination and pronation.  She lacks about 5 degrees of extension.  Strength is 5 out of 5 with pain.  Sensory and motor are intact.

## 2023-12-20 NOTE — DATA REVIEWED
[FreeTextEntry1] : 3 x-ray views repeated in the office today of her right elbow show no posterior fat pad sign and no shift or change in the alignment of the radial neck fracture.

## 2023-12-20 NOTE — HISTORY OF PRESENT ILLNESS
[de-identified] : Patient is a 73-year-old female here for repeat evaluation of her right elbow.  She is about 2 weeks status post nondisplaced radial neck fracture.  She has been better.  She still has some discomfort, but overall is doing well.

## 2023-12-20 NOTE — DISCUSSION/SUMMARY
[de-identified] : Patient is about 2 weeks status post the injury.  She understands that these injuries take 4 to 6 weeks to heal.  I recommend she work on her range of motion.  She will continue to avoid any pushing, pulling, or heavy lifting.  I will see her back in about 4 weeks for repeat evaluation.  No x-rays needed at that appointment.  All questions were answered today.

## 2024-01-17 ENCOUNTER — APPOINTMENT (OUTPATIENT)
Dept: ORTHOPEDIC SURGERY | Facility: CLINIC | Age: 74
End: 2024-01-17

## 2024-10-01 NOTE — ASU PATIENT PROFILE, ADULT - AS SC BRADEN FRICTION
Addended by: TAMIKO MALDONADO on: 10/1/2024 09:03 AM     Modules accepted: Orders    
(3) no apparent problem

## 2024-11-14 NOTE — PATIENT PROFILE ADULT - HOME ACCESSIBILITY CONCERNS
[de-identified] : Fever and Cough [FreeTextEntry6] : Since last visit overall feeling better but still with a low-grade fever (100.7 this am) and her cough is getting worse. Cough remains dry and non-productive.  none

## 2025-06-25 NOTE — PATIENT PROFILE ADULT - FUNCTIONAL ASSESSMENT - BASIC MOBILITY 6.
Subjective     Patient ID: Alisia Vines is a 30 y.o. female.    Chief Complaint: ear infection (Pt states she finished ABT yesterday but still feels like she has the problem. C/o pain at night when lying down)    Patient is a very pleasant 30 year old female here to see me today for evaluation of otalgia.  She reports recent ear infection that was treated at .  Completed Augmentin yesterday but continues with otalgia, L>R.  Describes it as pressure; worse at night when lying down.  Denies hearing loss, tinnitus or ear drainage.  She does not smoke.  No fever or recent URI.    She has been using Flonase daily for past one month.  She has recently noticed that she's clenching her teeth; says her mother has noticed it as well.  She did see her dentist about 2 months ago.  She does not wear a nightguard.    She has no history of otologic surgeries.  No family history of hearing loss.  No loud noise exposure history.        Review of Systems   Constitutional: Negative.  Negative for fever.   HENT:  Positive for ear pain. Negative for ear discharge, hearing loss and tinnitus.    Eyes: Negative.    Respiratory: Negative.     Cardiovascular: Negative.    Endocrine: Negative.    Genitourinary: Negative.    Integumentary:  Negative.   Allergic/Immunologic: Negative.    Neurological: Negative.  Negative for dizziness.   Hematological: Negative.    Psychiatric/Behavioral: Negative.            Objective     Physical Exam  Constitutional:       Appearance: Normal appearance.   HENT:      Head: Normocephalic and atraumatic.      Right Ear: Tympanic membrane, ear canal and external ear normal. No middle ear effusion. There is no impacted cerumen. Tympanic membrane is not erythematous.      Left Ear: Tympanic membrane, ear canal and external ear normal.  No middle ear effusion. There is no impacted cerumen. Tympanic membrane is not erythematous.      Nose: Nose normal. No congestion or rhinorrhea.      Mouth/Throat:       Mouth: Mucous membranes are moist.      Pharynx: Oropharynx is clear.   Eyes:      Pupils: Pupils are equal, round, and reactive to light.   Pulmonary:      Effort: Pulmonary effort is normal.   Neurological:      General: No focal deficit present.      Mental Status: She is alert.   Psychiatric:         Behavior: Behavior is cooperative.       Tympanograms:  AS  0.3 mL @ -4 daPa, ECV 0.6 mL  AD  0.4 mL @ 1 daPa, ECV 0.7 mL         Assessment and Plan     1. Otalgia of both ears    2. Bilateral temporomandibular joint pain        Reassured patient that her ear exam is unremarkable.  Her tympanograms are Type A normal as well.  We had a long discussion regarding the underlying pathology of temporomandibular joint dysfunction (TMD) as the cause of ear pain.  We further discussed conservative measures to treat TMD including avoiding gum and other foods that require lots of chewing, warm compresses, and scheduled antinflammatories.  If the pain persists, the patient will then schedule an appointment with a dentist for further evaluation.  I would recommend scheduling an audiogram with any concerns about her hearing.           No follow-ups on file.     4 = No assist / stand by assistance